# Patient Record
Sex: FEMALE | Race: WHITE | Employment: FULL TIME | URBAN - METROPOLITAN AREA
[De-identification: names, ages, dates, MRNs, and addresses within clinical notes are randomized per-mention and may not be internally consistent; named-entity substitution may affect disease eponyms.]

---

## 2023-02-20 ENCOUNTER — HOSPITAL ENCOUNTER (OUTPATIENT)
Dept: REHABILITATION | Age: 53
Discharge: HOME | End: 2023-02-23
Payer: COMMERCIAL

## 2023-02-20 ENCOUNTER — HOSPITAL ENCOUNTER (OUTPATIENT)
Dept: SURGERY | Age: 53
Discharge: HOME | End: 2023-02-23
Payer: COMMERCIAL

## 2023-02-20 VITALS
OXYGEN SATURATION: 96 % | DIASTOLIC BLOOD PRESSURE: 89 MMHG | SYSTOLIC BLOOD PRESSURE: 147 MMHG | WEIGHT: 185.7 LBS | HEIGHT: 64 IN | RESPIRATION RATE: 16 BRPM | HEART RATE: 78 BPM | TEMPERATURE: 98.1 F | BODY MASS INDEX: 31.7 KG/M2

## 2023-02-20 DIAGNOSIS — R06.83 SNORING: ICD-10-CM

## 2023-02-20 LAB
BACTERIA SPEC CULT: NORMAL
SERVICE CMNT-IMP: NORMAL

## 2023-02-20 PROCEDURE — 97161 PT EVAL LOW COMPLEX 20 MIN: CPT

## 2023-02-20 PROCEDURE — 94664 DEMO&/EVAL PT USE INHALER: CPT

## 2023-02-20 PROCEDURE — 94760 N-INVAS EAR/PLS OXIMETRY 1: CPT

## 2023-02-20 PROCEDURE — 87641 MR-STAPH DNA AMP PROBE: CPT

## 2023-02-20 RX ORDER — AMLODIPINE BESYLATE 10 MG/1
10 TABLET ORAL EVERY EVENING
COMMUNITY

## 2023-02-20 RX ORDER — ONDANSETRON HYDROCHLORIDE 8 MG/1
8 TABLET, FILM COATED ORAL EVERY 8 HOURS PRN
COMMUNITY

## 2023-02-20 RX ORDER — FLUTICASONE FUROATE AND VILANTEROL 100; 25 UG/1; UG/1
1 POWDER RESPIRATORY (INHALATION) DAILY PRN
COMMUNITY

## 2023-02-20 RX ORDER — NITROFURANTOIN 25; 75 MG/1; MG/1
100 CAPSULE ORAL 2 TIMES DAILY
COMMUNITY

## 2023-02-20 RX ORDER — TRAMADOL HYDROCHLORIDE 50 MG/1
50 TABLET ORAL EVERY 6 HOURS PRN
COMMUNITY

## 2023-02-20 RX ORDER — HYDROCHLOROTHIAZIDE 12.5 MG/1
12.5 CAPSULE, GELATIN COATED ORAL EVERY EVENING
COMMUNITY

## 2023-02-20 RX ORDER — DIPHENHYDRAMINE HCL 25 MG
25 TABLET ORAL EVERY 6 HOURS PRN
COMMUNITY

## 2023-02-20 RX ORDER — RIZATRIPTAN BENZOATE 10 MG/1
10 TABLET ORAL
COMMUNITY

## 2023-02-20 RX ORDER — TRAMADOL HYDROCHLORIDE 200 MG/1
200 TABLET, EXTENDED RELEASE ORAL EVERY EVENING
COMMUNITY

## 2023-02-20 ASSESSMENT — PAIN SCALES - GENERAL
PAINLEVEL_OUTOF10: 6
PAINLEVEL_OUTOF10: 3

## 2023-02-20 ASSESSMENT — HOOS JR
BENDING TO THE FLOOR TO PICK UP OBJECT: 3
SITTING: 2
HOOS JR RAW SCORE: 15
HOOS JR RAW SCORE: 15
LYING IN BED (TURNING OVER, MAINTAINING HIP POSITION): 2
RISING FROM SITTING: 2
GOING UP OR DOWN STAIRS: 3
WALKING ON UNEVEN SURFACE: 3
HOOS JR TOTAL INTERVAL SCORE: 43.335

## 2023-02-20 ASSESSMENT — PULMONARY FUNCTION TESTS
FEV1 (%PREDICTED): 83
FEV1 (LITERS): 2.18

## 2023-02-20 ASSESSMENT — PAIN DESCRIPTION - ORIENTATION
ORIENTATION: RIGHT
ORIENTATION: RIGHT

## 2023-02-20 ASSESSMENT — PAIN DESCRIPTION - DESCRIPTORS: DESCRIPTORS: ACHING

## 2023-02-20 ASSESSMENT — PAIN - FUNCTIONAL ASSESSMENT: PAIN_FUNCTIONAL_ASSESSMENT: PREVENTS OR INTERFERES SOME ACTIVE ACTIVITIES AND ADLS

## 2023-02-20 ASSESSMENT — PAIN DESCRIPTION - LOCATION
LOCATION: HIP
LOCATION: HIP;LEG

## 2023-02-20 NOTE — PERIOP NOTE
Please fax recent lab results, EKG tracing and stress test results done for pre-op to [phone]      Thank you,     [facility] pre-assessment department    Phone Number: [phone]    Fax Number: [phone]        Please do not respond via e-mail to this message.

## 2023-02-20 NOTE — PROGRESS NOTES
02/20/23 1340   Treatment   Treatment Type Bedside spirometry   Oxygen Therapy/Pulse Ox   O2 Therapy Room air   Heart Rate 78   SpO2 96 %   Pulse Oximeter Device Mode Intermittent   $Pulse Oximeter $Spot check (single)   Bedside Spirometry   FEV-1/Actual (Liters) 2.18 Liters   FEV-1/Predicted (Liters) 83 Liters   RT Misc Charges   $RT Education $HHN/MDI/IPPB Demo or Eval  (spacer)   Pt's symptoms include:    Snoring  Tiredness- excessive daytime sleepiness  Waking up from sleep snoring  HTN  GERD  Neck size       33.5       cm  Modified Barnes stage 3  SACS Score 4  STOP BANG 4  Height     5 ' 3.5   \"   Weight   185  lbs  BMI 32.35    Sleepiness Scale:     Sitting and reading 3    Watching TV 3    Sitting inactive in a public place 0    As a passenger in a car for an hour without a break 2    Lying down to rest in the afternoon when circumstances Permits 3    Sitting and talking to someone 0    Sitting quietly after lunch without alcohol 2    In a car, while stopped for a few minutes 0    Total :   13    Refer patient for sleep study based on above assessment. [facility]  Phone number:  [phone]      Initial respiratory Assessment completed with pt. Pt was interviewed and evaluated in Joint camp prior to surgery. Patient ID:  Maxine Thomas  [id number]  45 y.o.  1970  Surgeon:  Donovan Marie  Date of Surgery: [email or web address]  Procedure: Total Right Hip Arthroplasty  Primary Care Physician: Ulisses Oconnell [phone]  Specialists:     BS:CLEAR, DIMINISHED      Pt taught proper COUGH technique  IS REVIEWED WITH PT AS WELL AS BENEFITS OF USING IS IN SEDENTARY PTS.   DIAPHRAGMATIC BREATHING EXERCISE INSTRUCTIONS GIVEN    History of smoking:   DENIES                 Quit date:         Secondhand smoke:DENIES    Past procedures with Oxygen desaturation or delayed awakening:DENIES    Past Medical History:   Diagnosis Date    Arthritis     Depression     No meds    GERD (gastroesophageal reflux disease)     OTC meds as needed    History of snoring     Hypertension     Managed with meds    PONV (postoperative nausea and vomiting)     Seasonal allergies     Inhaler as needed- last use 2/19/23    Streptococcal arthritis of right hip ([address] 12/13/2021    HX OF COVID  HX OF PNA  HX OF WHEEZING    Respiratory history:DENIES SOB                                                                  Respiratory meds:  BREO Q DAY  PT uses MDI PRN with PROAIR. MDI instructions given verbally & written along with spacer. Pt to use home MDI's morning of surgery & bring to [address]:                PAST SLEEP STUDY:                     DENIES  HX OF KYLE:                                         DENIES  KYLE assessment:     DANGERS OF UNTREATED KYLE EXPLAINED TO PT.                                          SLEEPS ON    BACK           PHYSICAL EXAM   Body mass index is 32.38 kg/m². Vitals:    02/20/23 1340   BP:    Pulse: (P) 78   Resp:    Temp:    SpO2: (P) 96%     Neck circumference:  33.5    cm    Loud snoring:                                                 YES             Witnessed apnea or wakening gasping or choking:        WAKES UP SNORNG  Awakens with headaches:                                               DENIES  Morning or daytime tiredness/ sleepiness:                             TIRED  Dry mouth or sore throat in morning:            YES                                              Barnes stage:  3                                   SACS score:4  Stop Bang                                    CS HS  RESPIRATORY ASSESSMENT Q SHIFT   O2 PRN    ALBUTEROL  NEBULIZER Q6 PRN WHEEZING                                           Referrals:  [facility]  Pt.  Phone Number:

## 2023-02-20 NOTE — PROGRESS NOTES
Total Joint Surgery Preoperative Chart Review      Patient ID:  Edmar Garay  [id number]  45 y.o.  1970  Surgeon: Dr Morales Zamorano  Date of Surgery: 3/2/2023  Procedure: Total Right Hip Arthroplasty  Primary Care Physician: Maria Isabel Uriostegui [phone]  Specialty Physician(s):      Subjective:   Edmar Garay is a 45 y.o. White (non-) female who presents for preoperative evaluation for Total Right Hip arthroplasty. This is a preoperative chart review note based on data collected by the nurse at the surgical Pre-Assessment visit. Past Medical History:   Diagnosis Date    Arthritis     Depression     No meds    GERD (gastroesophageal reflux disease)     OTC meds as needed    History of snoring     Hypertension     Managed with meds    PONV (postoperative nausea and vomiting)     Seasonal allergies     Inhaler as needed- last use 2/19/23    Streptococcal arthritis of right hip ([address] 12/13/2021      Past Surgical History:   Procedure Laterality Date    GALLBLADDER SURGERY      INCISION AND DRAINAGE HIP Right     2021,2022    SHOULDER DEBRIDEMENT Right 2022    TUBAL LIGATION  2004     History reviewed. No pertinent family history. Social History     Tobacco Use    Smoking status: Never    Smokeless tobacco: Never   Substance Use Topics    Alcohol use: Not Currently       Prior to Admission medications    Medication Sig Start Date End Date Taking? Authorizing Provider   amLODIPine (NORVASC) 10 MG tablet Take 10 mg by mouth every evening   Yes Historical Provider, MD   hydroCHLOROthiazide (MICROZIDE) 12.5 MG capsule Take 12.5 mg by mouth every evening   Yes Historical Provider, MD   nitrofurantoin, macrocrystal-monohydrate, (MACROBID) 100 MG capsule Take 100 mg by mouth 2 times daily 3 more days left   Yes Historical Provider, MD   traMADol Sukumar ALICIA) 200 MG extended release tablet Take 200 mg by mouth every evening.    Yes Historical Provider, MD   traMADol (ULTRAM) 50 MG tablet Take 50 mg by mouth every 6 hours as needed for Pain. Yes Historical Provider, MD   Diclofenac Sodium 100 MG TB24 Take 100 mg by mouth every evening   Yes Historical Provider, MD   ondansetron (ZOFRAN) 8 MG tablet Take 8 mg by mouth every 8 hours as needed for Nausea or Vomiting   Yes Historical Provider, MD   rizatriptan (MAXALT) 10 MG tablet Take 10 mg by mouth once as needed for Migraine May repeat in 2 hours if needed   Yes Historical Provider, MD   fluticasone furoate-vilanterol (BREO ELLIPTA) 100-25 MCG/ACT inhaler Inhale 1 puff into the lungs daily as needed (Seasonal allergies)   Yes Historical Provider, MD   diphenhydrAMINE (BENADRYL) 25 MG tablet Take 25 mg by mouth every 6 hours as needed for Sleep or Allergies   Yes Historical Provider, MD   Coenzyme Q10 (COQ-10) 100 MG CAPS Take by mouth daily   Yes Historical Provider, MD   Multiple Vitamins-Minerals (MULTI COMPLETE PO) Take by mouth daily   Yes Historical Provider, MD   Potassium 95 MG TABS Take by mouth daily   Yes Historical Provider, MD   vitamin D (CHOLECALCIFEROL) 25 MCG (1000 UT) TABS tablet Take 1,000 Units by mouth daily   Yes Historical Provider, MD   metFORMIN (GLUCOPHAGE) 1000 MG tablet Take 1,500 mg by mouth every evening   Yes Historical Provider, MD     No Known Allergies       Objective:     Physical Exam:   Patient Vitals for the past 24 hrs:   Temp Pulse Resp BP SpO2   02/20/23 1340 -- 78 -- -- 96 %   02/20/23 1228 98.1 °F (36.7 °C) 76 16 (!) 147/89 92 %       ECG:    No results found for this or any previous visit (from the past 4464 hour(s)). Data Review:   Labs:   Requested labs from PCP       Problem List:  )  Patient Active Problem List   Diagnosis    Snoring       Total Joint Surgery Pre-Assessment Recommendations:           Patient reports the symptoms of snoring, fatigue, observed apnea and /or excessive daytime sleepiness. Will refer patient for HST based on above assessment.    Recommend continuous saturation monitoring hours of sleep, during hospitalization. Albuterol every 6 hours as need during hospitalization.        Signed By: Cari Nguyễn, WANDA - CNP-C    February 20, 2023

## 2023-02-20 NOTE — PROGRESS NOTES
Karel Leary  : 1970  Primary: Jojo Ruiz  Secondary:  Joint Camp at [facility]  [address], [address]  [phone]        Physical Therapy Prehab Evaluation Summary:2023   Time In/Out   PT Charge Capture  Episode     MEDICAL/REFERRING DIAGNOSIS: Unilateral primary osteoarthritis, right hip [M16.11]  REFERRING PHYSICIAN: Yesica Cordero MD    ICD-10: Treatment Diagnosis:   Pain in Right Hip (M25.551)  Stiffness of Right Hip, Not elsewhere classified (M25.651)    DATE OF SURGERY: 3/2/23  Assessment:   COMMENTS:  [place]. States she will be having anterior approach. Arrives to department via W/C but ambulated around department without an AD although gait is quite antalgic. PROBLEM LIST:   (Impacting functional limitations):  Ms. Corwin Mullins presents with the following lower extremity(s) problems:  Gait  Strength  Range of Motion  Home Exercise Program  Pain INTERVENTIONS PLANNED:   (Benefits and precautions of physical therapy have been discussed with the patient.)  Home Exercise Program  Educational Discussion       GOALS: (Goals have been discussed and agreed upon with patient.)  Discharge Goals: Time Frame: 1 Day  Patient will demonstrate independence with a home exercise program designed to increase functional technique and pain control to minimize functional deficits and optimize patient for total joint replacement. Subjective:   Past Medical History/Comorbidities:   Ms. Corwin Mullins  has a past medical history of Arthritis, Depression, GERD (gastroesophageal reflux disease), History of snoring, Hypertension, PONV (postoperative nausea and vomiting), Seasonal allergies, and Streptococcal arthritis of right hip ([facility] [address] Ms. Corwin Mullins  has a past surgical history that includes Incision and drainage hip (Right); Shoulder Debridement (Right, ); Gallbladder surgery; and Tubal ligation ().     Allergies:  Patient has no known allergies.     Current Medications:  Refer to pre-assessment nursing note    Home Set-Up/Prior Level of Function:  Lives With: Spouse  Type of Home: House  Home Layout: One level  Home Access: Stairs to enter without rails  Entrance Stairs - Number of Steps: 3  Bathroom Shower/Tub: Walk-in shower  Bathroom Equipment: Shower chair, Toilet raiser  Home Equipment: Alda Laila, standard, Rollator, Walker, rolling, Cane, Crutches    Dominant Side:  Dominant Hand: : Right    Current Functional Status:   Ambulation:  [x] Independent  [x] Walk Indoors Only  [] Walk Outdoors  [] Use Assistive Device  [x] Use Wheelchair for longer distances Dressing:  [x] [address] from Someone for:  [x] Sock/Shoes-sometimes  [] Pants  [] Everything   Bathing/Showering:   [x] Independent  [] Requires Assistance from Someone  [] [address]:  [] Routine house and yard work  [] Light Housework Only  [x] None     Objective:   PAIN:   Pre-Treatment Pain  Pain Assessment: 0-10  Pain Level: 3 (3 in sitting; 6 with activity)  Pain Location: Hip, Leg  Pain Orientation: Right    Post Treatment: 3    Outcome Measure:   HOOS-JR:  Total Raw Score (0-24 Scale): 15    KOOS-JR:       LOWER EXTREMITY GROSS EVALUATION:  RIGHT LE   Within Functional Limits   Abnormal   Comments   Strength [] [] Strength RLE  R Hip Flexion: 2+/5  R Knee Flexion: 4-/5  R Knee Extension: 4-/5   Range of Motion [] [] AROM RLE (degrees)  R Hip Flexion (0-125): 95  R Hip ABduction (0-45): 10      LEFT LE Within Functional Limits   Abnormal   Comments   Strength [x] []     Range of Motion [x] []       UPPER EXTREMITY GROSS EVALUATION:     Within Functional Limits   Abnormal   Comments   Strength [] []    Range of Motion [x] []      SENSATION  Sensation [x]       COGNITION/  PERCEPTION: Intact Impaired (Comments):   Orientation [x]     Vision [x]     Hearing [x]     Cognition  [x]       TRANSFERS: I Mod I S SBA CGA Min Mod Max Total  NT x2 Comments:   Sit to Stand [] [x] [] [] [] [] [] [] [] [] []    Stand to Sit [] [x] [] [] [] [] [] [] [] [] []    Stand pivot [x] [] [] [] [] [] [] [] [] [] []     [] [] [] [] [] [] [] [] [] [] []    I=Independent, Mod I=Modified Independent, S=Supervision, SBA=Standby Assistance, CGA=Contact Guard Assistance,   Min=Minimal Assistance, Mod=Moderate Assistance, Max=Maximal Assistance, Total=Total Assistance, NT=Not Tested    BALANCE: Good Fair+ Fair Fair- Poor NT Comments   Sitting Static [x] [] [] [] [] []    Sitting Dynamic [x] [] [] [] [] []              Standing Static [] [x] [] [] [] []    Standing Dynamic [] [x] [x] [] [] []      Postural Assessment: Forward Head  Rounded Shoulders    GAIT: I Mod I S SBA CGA Min Mod Max Total  NT x2 Comments:   Level of Assistance [x] [] [] [] [] [] [] [] [] [] []    Weightbearing Status       Distance  50 feet    Gait Quality Antalgic, Decreased guerline , Decreased step length, Decreased stance, and Trunk sway increased    DME None     Stairs      Ramp     I=Independent, Mod I=Modified Independent, S=Supervision, SBA=Standby Assistance, CGA=Contact Guard Assistance,   Min=Minimal Assistance, Mod=Moderate Assistance, Max=Maximal Assistance, Total=Total Assistance, NT=Not Tested    TREATMENT:   EVALUATION: LOW COMPLEXITY: (Untimed Charge)    TREATMENT PLAN:   Effective Dates: 2/20/2023 TO 2/20/2023. Treatment/Session Assessment:  Patient was instructed in PT- HEP to increase strength and ROM in LEs. Answered all questions. Frequency/Duration: Patient to continue to perform home exercise program at least twice per day up until her surgery.     EDUCATION: Education Given To: Patient, Family  Education Provided: Role of Therapy, Plan of Care, Home Exercise Program  Education Method: Demonstration, Verbal, Printed Information/Hand-outs  Education Outcome: Verbalized understanding, Demonstrated understanding    MEDICAL NECESSITY: Ms. Corwin Mullins is expected to optimize [facility] extremity strength and ROM in preparation for joint replacement surgery. REASON FOR CONTINUED SERVICES: Achieve baseline assesment of musculoskeletal system, functional mobility and home environment. , educate in PT HEP in preparation for surgery, educate in hospital plan of care. COMPLIANCE WITH PROGRAM/EXERCISE: Will assess as treatment progresses. TOTAL TREATMENT DURATION:  Time In: 1200  Time Out: 1230  Minutes: 27    Regarding Silvestre Clay's therapy, I certify that the treatment plan above will be carried out by a therapist or under their direction.   Thank you for this referral,  Lauren Healy, PT

## 2023-02-20 NOTE — PERIOP NOTE
PLEASE CONTINUE TAKING ALL PRESCRIPTION MEDICATIONS UP TO THE DAY OF SURGERY UNLESS OTHERWISE DIRECTED BELOW. DISCONTINUE all vitamins, herbals and supplements 21 days prior to surgery. DISCONTINUE Non-Steriodal Anti-Inflammatory (NSAIDS) such as Advil, Ibuprofen, Motrin, Naproxen and Aleve 5 days prior to surgery. Home Medications to take  the day of surgery   Tramadol if needed, Breo inhaler            Home Medications   to Hold   Stop diclofenac five days prior to surgery    Stop vitamins and supplements now      Comments      On the day before surgery please take Acetaminophen 1000mg in the morning and then again before bed. You may substitute for Tylenol 650 mg.    Bring Inhaler,Dynahex wash and Incentive Spirometer with you to hospital on the day of surgery. Please do not bring home medications with you on the day of surgery unless otherwise directed by your nurse. If you are instructed to bring home medications, please give them to your nurse as they will be administered by the nursing staff. If you have any questions, please call Dolores Alegria [phone]. A copy of this note was provided to the patient for reference.

## 2023-02-20 NOTE — PERIOP NOTE
Patient verified name and . Order for consent found in EHR and matches case posting; patient verified. Type 3 surgery, PAT Joint assessment complete. Labs per surgeon: none. Labs per anesthesia protocol: per pt had pre-op labs done at Dr. Albino Gonzalez on 2/15/23; fax sent requesting lab results/EKG/stress test.   EKG:requested from Dr. Joey Shannon office. MRSA/MSSA swab collected; pharmacy to review and dose antibiotic as appropriate. Hospital approved surgical skin cleanser and instructions to return bottle on DOS given per hospital policy. Patient provided with handouts including Guide to Surgery, Pain Management, Hand Hygiene, Blood Transfusion Education, and [facility] Brochure. Patient answered medical/surgical history questions at their best of ability. All prior to admission medications documented in [facility]. Original medication prescription bottle not visualized during patient appointment. Patient instructed to hold all vitamins 3 weeks prior to surgery and NSAIDS 5 days prior to surgery. Patient teach back successful and patient demonstrates knowledge of instruction.

## 2023-02-21 NOTE — PROGRESS NOTES
Message left at medical records at Dr. Alin Boykin office requesting last labs, EKG and stress test.

## 2023-02-27 NOTE — PROGRESS NOTES
Called Dr. Judith Granda office and left message requesting latest labs, EKG tracing and stress test be faxed to PAT.

## 2023-02-27 NOTE — PROGRESS NOTES
Message left for medical records at Dr. Isha Blackwell office requesting latest labs, EKG tracing and stress test be faxed to PAT.

## 2023-02-28 NOTE — PROGRESS NOTES
Records received from Dr. Claudia Sibley office, including labs dated 02/24/23, 02/14/23 & 02/10/23, EKG 02/10/23 and Stress Test 02/15/23, scanned into EHR, anesthesia to review.

## 2023-03-01 ENCOUNTER — ANESTHESIA EVENT (OUTPATIENT)
Dept: SURGERY | Age: 53
End: 2023-03-01
Payer: COMMERCIAL

## 2023-03-02 ENCOUNTER — APPOINTMENT (OUTPATIENT)
Dept: GENERAL RADIOLOGY | Age: 53
End: 2023-03-02
Attending: ORTHOPAEDIC SURGERY
Payer: COMMERCIAL

## 2023-03-02 ENCOUNTER — ANESTHESIA (OUTPATIENT)
Dept: SURGERY | Age: 53
End: 2023-03-02
Payer: COMMERCIAL

## 2023-03-02 ENCOUNTER — HOSPITAL ENCOUNTER (OUTPATIENT)
Age: 53
Discharge: HOME | End: 2023-03-03
Attending: ORTHOPAEDIC SURGERY | Admitting: ORTHOPAEDIC SURGERY
Payer: COMMERCIAL

## 2023-03-02 DIAGNOSIS — Z01.818 PRE-OP TESTING: Primary | ICD-10-CM

## 2023-03-02 DIAGNOSIS — M16.11 OSTEOARTHRITIS OF RIGHT HIP, UNSPECIFIED OSTEOARTHRITIS TYPE: ICD-10-CM

## 2023-03-02 PROBLEM — R73.03 PREDIABETES: Status: ACTIVE | Noted: 2023-03-02

## 2023-03-02 PROBLEM — E66.9 OBESITY (BMI 30.0-34.9): Status: ACTIVE | Noted: 2023-03-02

## 2023-03-02 PROBLEM — I10 ESSENTIAL HYPERTENSION: Status: ACTIVE | Noted: 2023-03-02

## 2023-03-02 LAB
ABO + RH BLD: NORMAL
BLOOD GROUP ANTIBODIES SERPL: NORMAL
GLUCOSE BLD STRIP.AUTO-MCNC: 88 MG/DL (ref 65–100)
HCT VFR BLD AUTO: 33.1 % (ref 35.8–46.3)
HGB BLD-MCNC: 10.5 G/DL (ref 11.7–15.4)
POTASSIUM BLD-SCNC: 3.7 MMOL/L (ref 3.5–5.1)
SERVICE CMNT-IMP: NORMAL
SPECIMEN EXP DATE BLD: NORMAL

## 2023-03-02 PROCEDURE — 6360000002 HC RX W HCPCS: Performed by: ORTHOPAEDIC SURGERY

## 2023-03-02 PROCEDURE — 6360000002 HC RX W HCPCS: Performed by: NURSE ANESTHETIST, CERTIFIED REGISTERED

## 2023-03-02 PROCEDURE — 6370000000 HC RX 637 (ALT 250 FOR IP): Performed by: ORTHOPAEDIC SURGERY

## 2023-03-02 PROCEDURE — 2580000003 HC RX 258: Performed by: ANESTHESIOLOGY

## 2023-03-02 PROCEDURE — 88307 TISSUE EXAM BY PATHOLOGIST: CPT

## 2023-03-02 PROCEDURE — 2580000003 HC RX 258: Performed by: ORTHOPAEDIC SURGERY

## 2023-03-02 PROCEDURE — 88311 DECALCIFY TISSUE: CPT

## 2023-03-02 PROCEDURE — 6370000000 HC RX 637 (ALT 250 FOR IP): Performed by: ANESTHESIOLOGY

## 2023-03-02 PROCEDURE — 3600000015 HC SURGERY LEVEL 5 ADDTL 15MIN: Performed by: ORTHOPAEDIC SURGERY

## 2023-03-02 PROCEDURE — 87075 CULTR BACTERIA EXCEPT BLOOD: CPT

## 2023-03-02 PROCEDURE — 7100000000 HC PACU RECOVERY - FIRST 15 MIN: Performed by: ORTHOPAEDIC SURGERY

## 2023-03-02 PROCEDURE — 2500000003 HC RX 250 WO HCPCS: Performed by: NURSE ANESTHETIST, CERTIFIED REGISTERED

## 2023-03-02 PROCEDURE — 6370000000 HC RX 637 (ALT 250 FOR IP)

## 2023-03-02 PROCEDURE — 3700000000 HC ANESTHESIA ATTENDED CARE: Performed by: ORTHOPAEDIC SURGERY

## 2023-03-02 PROCEDURE — 3700000001 HC ADD 15 MINUTES (ANESTHESIA): Performed by: ORTHOPAEDIC SURGERY

## 2023-03-02 PROCEDURE — 87176 TISSUE HOMOGENIZATION CULTR: CPT

## 2023-03-02 PROCEDURE — 82962 GLUCOSE BLOOD TEST: CPT

## 2023-03-02 PROCEDURE — 86850 RBC ANTIBODY SCREEN: CPT

## 2023-03-02 PROCEDURE — 7100000001 HC PACU RECOVERY - ADDTL 15 MIN: Performed by: ORTHOPAEDIC SURGERY

## 2023-03-02 PROCEDURE — 2500000003 HC RX 250 WO HCPCS: Performed by: ORTHOPAEDIC SURGERY

## 2023-03-02 PROCEDURE — 2700000000 HC OXYGEN THERAPY PER DAY

## 2023-03-02 PROCEDURE — 6360000002 HC RX W HCPCS: Performed by: ANESTHESIOLOGY

## 2023-03-02 PROCEDURE — 72170 X-RAY EXAM OF PELVIS: CPT

## 2023-03-02 PROCEDURE — A4217 STERILE WATER/SALINE, 500 ML: HCPCS | Performed by: ORTHOPAEDIC SURGERY

## 2023-03-02 PROCEDURE — C1713 ANCHOR/SCREW BN/BN,TIS/BN: HCPCS | Performed by: ORTHOPAEDIC SURGERY

## 2023-03-02 PROCEDURE — 84132 ASSAY OF SERUM POTASSIUM: CPT

## 2023-03-02 PROCEDURE — 88304 TISSUE EXAM BY PATHOLOGIST: CPT

## 2023-03-02 PROCEDURE — 87205 SMEAR GRAM STAIN: CPT

## 2023-03-02 PROCEDURE — 2709999900 HC NON-CHARGEABLE SUPPLY: Performed by: ORTHOPAEDIC SURGERY

## 2023-03-02 PROCEDURE — 2720000010 HC SURG SUPPLY STERILE: Performed by: ORTHOPAEDIC SURGERY

## 2023-03-02 PROCEDURE — C1776 JOINT DEVICE (IMPLANTABLE): HCPCS | Performed by: ORTHOPAEDIC SURGERY

## 2023-03-02 PROCEDURE — 85014 HEMATOCRIT: CPT

## 2023-03-02 PROCEDURE — 3600000005 HC SURGERY LEVEL 5 BASE: Performed by: ORTHOPAEDIC SURGERY

## 2023-03-02 PROCEDURE — 94760 N-INVAS EAR/PLS OXIMETRY 1: CPT

## 2023-03-02 PROCEDURE — 2500000003 HC RX 250 WO HCPCS: Performed by: ANESTHESIOLOGY

## 2023-03-02 DEVICE — SCREW BNE L25MM DIA6.5MM CANC HIP S STL GRIPTION FULL THRD: Type: IMPLANTABLE DEVICE | Site: HIP | Status: FUNCTIONAL

## 2023-03-02 DEVICE — LINER ACET OD54MM ID36MM HIP ALTRX PINN: Type: IMPLANTABLE DEVICE | Site: HIP | Status: FUNCTIONAL

## 2023-03-02 DEVICE — STEM FEM SZ 2 HI OFFSET CLLRD 12/14 TAPR ACTIS ARTC EZ: Type: IMPLANTABLE DEVICE | Site: HIP | Status: FUNCTIONAL

## 2023-03-02 DEVICE — HIP H2 TOT ADV OTHER HD IMPL CAPPED SYNTHES: Type: IMPLANTABLE DEVICE | Site: HIP | Status: FUNCTIONAL

## 2023-03-02 DEVICE — SCREW BNE L20MM DIA6.5MM CANC HIP S STL GRIPTION FULL THRD: Type: IMPLANTABLE DEVICE | Site: HIP | Status: FUNCTIONAL

## 2023-03-02 DEVICE — HEAD FEM DIA36MM +1.5MM OFFSET 12/14 TAPR HIP CERAMIC: Type: IMPLANTABLE DEVICE | Site: HIP | Status: FUNCTIONAL

## 2023-03-02 DEVICE — STIMULAN® RAPID CURE PROVIDED STERILE FOR SINGLE PATIENT USE. STIMULAN® RAPID CURE CONTAINS CALCIUM SULFATE POWDER AND MIXING SOLUTION IN PRE-MEASURED QUANTITIES SO THAT WHEN MIXED TOGETHER IN A STERILE MIXING BOWL, THE RESULTANT PASTE IS TO BE DIGITALLY PACKED INTO OPEN BONE VOID/GAP TO SET INSITU OR PLACED INTO THE MOULD PROVIDED, THE MIXTURE SETS TO FORM BEADS. THE BIODEGRADABLE, RADIOPAQUE BEADS ARE RESORBED IN APPROXIMATELY 30 – 60 DAYS WHEN USED IN ACCORDANCE WITH THE DEVICE LABELLING. STIMULAN® RAPID CURE IS MANUFACTURED FROM SYNTHETIC IMPLANT GRADE CALCIUM SULFATE DIHYDRATE(CASO4.2H2O) THAT RESORBS AND IS REPLACED WITH BONE DURING THE HEALING PROCESS. ALSO, AS THE BONE VOID FILLER BEADS ARE BIODEGRADABLE AND BIOCOMPATIBLE, THEY MAY BE USED AT AN INFECTED SITE.
Type: IMPLANTABLE DEVICE | Site: HIP | Status: FUNCTIONAL
Brand: STIMULAN® RAPID CURE

## 2023-03-02 RX ORDER — SODIUM CHLORIDE 9 MG/ML
INJECTION, SOLUTION INTRAVENOUS PRN
Status: DISCONTINUED | OUTPATIENT
Start: 2023-03-02 | End: 2023-03-02 | Stop reason: HOSPADM

## 2023-03-02 RX ORDER — ROCURONIUM BROMIDE 10 MG/ML
INJECTION, SOLUTION INTRAVENOUS PRN
Status: DISCONTINUED | OUTPATIENT
Start: 2023-03-02 | End: 2023-03-02 | Stop reason: SDUPTHER

## 2023-03-02 RX ORDER — ONDANSETRON 2 MG/ML
4 INJECTION INTRAMUSCULAR; INTRAVENOUS
Status: DISCONTINUED | OUTPATIENT
Start: 2023-03-02 | End: 2023-03-02 | Stop reason: HOSPADM

## 2023-03-02 RX ORDER — MELOXICAM 15 MG/1
15 TABLET ORAL DAILY
Qty: 14 TABLET | Refills: 0 | Status: SHIPPED | OUTPATIENT
Start: 2023-03-03 | End: 2023-03-17

## 2023-03-02 RX ORDER — KETOROLAC TROMETHAMINE 30 MG/ML
INJECTION, SOLUTION INTRAMUSCULAR; INTRAVENOUS PRN
Status: DISCONTINUED | OUTPATIENT
Start: 2023-03-02 | End: 2023-03-02 | Stop reason: HOSPADM

## 2023-03-02 RX ORDER — LIDOCAINE HYDROCHLORIDE 10 MG/ML
1 INJECTION, SOLUTION INFILTRATION; PERINEURAL
Status: DISCONTINUED | OUTPATIENT
Start: 2023-03-02 | End: 2023-03-02 | Stop reason: HOSPADM

## 2023-03-02 RX ORDER — CELECOXIB 200 MG/1
200 CAPSULE ORAL ONCE
Status: COMPLETED | OUTPATIENT
Start: 2023-03-02 | End: 2023-03-02

## 2023-03-02 RX ORDER — HYDROCHLOROTHIAZIDE 12.5 MG/1
12.5 CAPSULE, GELATIN COATED ORAL EVERY EVENING
Status: DISCONTINUED | OUTPATIENT
Start: 2023-03-02 | End: 2023-03-03 | Stop reason: HOSPADM

## 2023-03-02 RX ORDER — APREPITANT 40 MG/1
40 CAPSULE ORAL ONCE
Status: COMPLETED | OUTPATIENT
Start: 2023-03-02 | End: 2023-03-02

## 2023-03-02 RX ORDER — DEXAMETHASONE SODIUM PHOSPHATE 4 MG/ML
INJECTION, SOLUTION INTRA-ARTICULAR; INTRALESIONAL; INTRAMUSCULAR; INTRAVENOUS; SOFT TISSUE PRN
Status: DISCONTINUED | OUTPATIENT
Start: 2023-03-02 | End: 2023-03-02 | Stop reason: SDUPTHER

## 2023-03-02 RX ORDER — SODIUM CHLORIDE 0.9 % (FLUSH) 0.9 %
5-40 SYRINGE (ML) INJECTION EVERY 12 HOURS SCHEDULED
Status: DISCONTINUED | OUTPATIENT
Start: 2023-03-02 | End: 2023-03-02 | Stop reason: HOSPADM

## 2023-03-02 RX ORDER — ACETAMINOPHEN 500 MG
1000 TABLET ORAL EVERY 8 HOURS
Status: DISCONTINUED | OUTPATIENT
Start: 2023-03-02 | End: 2023-03-03 | Stop reason: HOSPADM

## 2023-03-02 RX ORDER — SODIUM CHLORIDE 0.9 % (FLUSH) 0.9 %
5-40 SYRINGE (ML) INJECTION PRN
Status: DISCONTINUED | OUTPATIENT
Start: 2023-03-02 | End: 2023-03-02 | Stop reason: HOSPADM

## 2023-03-02 RX ORDER — ACETAMINOPHEN 500 MG
1000 TABLET ORAL ONCE
Status: DISCONTINUED | OUTPATIENT
Start: 2023-03-02 | End: 2023-03-02 | Stop reason: HOSPADM

## 2023-03-02 RX ORDER — GLYCOPYRROLATE 0.2 MG/ML
INJECTION INTRAMUSCULAR; INTRAVENOUS PRN
Status: DISCONTINUED | OUTPATIENT
Start: 2023-03-02 | End: 2023-03-02 | Stop reason: SDUPTHER

## 2023-03-02 RX ORDER — DEXAMETHASONE SODIUM PHOSPHATE 10 MG/ML
8 INJECTION INTRAMUSCULAR; INTRAVENOUS ONCE
Status: DISCONTINUED | OUTPATIENT
Start: 2023-03-02 | End: 2023-03-02 | Stop reason: HOSPADM

## 2023-03-02 RX ORDER — PROCHLORPERAZINE EDISYLATE 5 MG/ML
5 INJECTION INTRAMUSCULAR; INTRAVENOUS
Status: COMPLETED | OUTPATIENT
Start: 2023-03-02 | End: 2023-03-02

## 2023-03-02 RX ORDER — SENNA AND DOCUSATE SODIUM 50; 8.6 MG/1; MG/1
1 TABLET, FILM COATED ORAL 2 TIMES DAILY
Qty: 28 TABLET | Refills: 0 | Status: SHIPPED | OUTPATIENT
Start: 2023-03-02 | End: 2023-03-16

## 2023-03-02 RX ORDER — MAGNESIUM HYDROXIDE 1200 MG/15ML
LIQUID ORAL CONTINUOUS PRN
Status: DISCONTINUED | OUTPATIENT
Start: 2023-03-02 | End: 2023-03-02 | Stop reason: HOSPADM

## 2023-03-02 RX ORDER — METFORMIN HYDROCHLORIDE 500 MG/1
1000 TABLET, EXTENDED RELEASE ORAL EVERY EVENING
Status: DISCONTINUED | OUTPATIENT
Start: 2023-03-02 | End: 2023-03-03 | Stop reason: HOSPADM

## 2023-03-02 RX ORDER — ACETAMINOPHEN 500 MG
1000 TABLET ORAL EVERY 8 HOURS
Qty: 84 TABLET | Refills: 0 | Status: SHIPPED | OUTPATIENT
Start: 2023-03-03 | End: 2023-03-17

## 2023-03-02 RX ORDER — SODIUM CHLORIDE 0.9 % (FLUSH) 0.9 %
5-40 SYRINGE (ML) INJECTION PRN
Status: DISCONTINUED | OUTPATIENT
Start: 2023-03-02 | End: 2023-03-03 | Stop reason: HOSPADM

## 2023-03-02 RX ORDER — BUPIVACAINE HYDROCHLORIDE AND EPINEPHRINE 5; 5 MG/ML; UG/ML
INJECTION, SOLUTION EPIDURAL; INTRACAUDAL; PERINEURAL PRN
Status: DISCONTINUED | OUTPATIENT
Start: 2023-03-02 | End: 2023-03-02 | Stop reason: HOSPADM

## 2023-03-02 RX ORDER — LIDOCAINE HYDROCHLORIDE 20 MG/ML
INJECTION, SOLUTION EPIDURAL; INFILTRATION; INTRACAUDAL; PERINEURAL PRN
Status: DISCONTINUED | OUTPATIENT
Start: 2023-03-02 | End: 2023-03-02 | Stop reason: SDUPTHER

## 2023-03-02 RX ORDER — TRANEXAMIC ACID 100 MG/ML
INJECTION, SOLUTION INTRAVENOUS PRN
Status: DISCONTINUED | OUTPATIENT
Start: 2023-03-02 | End: 2023-03-02 | Stop reason: SDUPTHER

## 2023-03-02 RX ORDER — ALBUTEROL SULFATE 2.5 MG/3ML
2.5 SOLUTION RESPIRATORY (INHALATION) EVERY 6 HOURS PRN
Status: DISCONTINUED | OUTPATIENT
Start: 2023-03-02 | End: 2023-03-03 | Stop reason: HOSPADM

## 2023-03-02 RX ORDER — EPHEDRINE SULFATE/0.9% NACL/PF 50 MG/5 ML
SYRINGE (ML) INTRAVENOUS PRN
Status: DISCONTINUED | OUTPATIENT
Start: 2023-03-02 | End: 2023-03-02 | Stop reason: SDUPTHER

## 2023-03-02 RX ORDER — PROPOFOL 10 MG/ML
INJECTION, EMULSION INTRAVENOUS PRN
Status: DISCONTINUED | OUTPATIENT
Start: 2023-03-02 | End: 2023-03-02 | Stop reason: SDUPTHER

## 2023-03-02 RX ORDER — ONDANSETRON 2 MG/ML
INJECTION INTRAMUSCULAR; INTRAVENOUS PRN
Status: DISCONTINUED | OUTPATIENT
Start: 2023-03-02 | End: 2023-03-02 | Stop reason: SDUPTHER

## 2023-03-02 RX ORDER — SODIUM CHLORIDE, SODIUM LACTATE, POTASSIUM CHLORIDE, CALCIUM CHLORIDE 600; 310; 30; 20 MG/100ML; MG/100ML; MG/100ML; MG/100ML
INJECTION, SOLUTION INTRAVENOUS CONTINUOUS
Status: DISCONTINUED | OUTPATIENT
Start: 2023-03-02 | End: 2023-03-03 | Stop reason: HOSPADM

## 2023-03-02 RX ORDER — SODIUM CHLORIDE 0.9 % (FLUSH) 0.9 %
5-40 SYRINGE (ML) INJECTION EVERY 12 HOURS SCHEDULED
Status: DISCONTINUED | OUTPATIENT
Start: 2023-03-02 | End: 2023-03-03 | Stop reason: HOSPADM

## 2023-03-02 RX ORDER — KETAMINE HCL IN NACL, ISO-OSM 20 MG/2 ML
10 SYRINGE (ML) INJECTION EVERY 10 MIN PRN
Status: DISCONTINUED | OUTPATIENT
Start: 2023-03-02 | End: 2023-03-03 | Stop reason: HOSPADM

## 2023-03-02 RX ORDER — ONDANSETRON 4 MG/1
4 TABLET, ORALLY DISINTEGRATING ORAL EVERY 8 HOURS PRN
Status: DISCONTINUED | OUTPATIENT
Start: 2023-03-02 | End: 2023-03-03 | Stop reason: HOSPADM

## 2023-03-02 RX ORDER — ASPIRIN 81 MG/1
81 TABLET ORAL 2 TIMES DAILY
Qty: 56 TABLET | Refills: 0 | Status: SHIPPED | OUTPATIENT
Start: 2023-03-02 | End: 2023-03-30

## 2023-03-02 RX ORDER — OXYCODONE HYDROCHLORIDE 5 MG/1
5 TABLET ORAL PRN
Status: COMPLETED | OUTPATIENT
Start: 2023-03-02 | End: 2023-03-02

## 2023-03-02 RX ORDER — SODIUM CHLORIDE 9 MG/ML
INJECTION, SOLUTION INTRAVENOUS PRN
Status: DISCONTINUED | OUTPATIENT
Start: 2023-03-02 | End: 2023-03-02 | Stop reason: SDUPTHER

## 2023-03-02 RX ORDER — SODIUM CHLORIDE 9 MG/ML
INJECTION, SOLUTION INTRAVENOUS CONTINUOUS
Status: DISCONTINUED | OUTPATIENT
Start: 2023-03-02 | End: 2023-03-02 | Stop reason: HOSPADM

## 2023-03-02 RX ORDER — DIPHENHYDRAMINE HYDROCHLORIDE 50 MG/ML
12.5 INJECTION INTRAMUSCULAR; INTRAVENOUS
Status: DISCONTINUED | OUTPATIENT
Start: 2023-03-02 | End: 2023-03-02 | Stop reason: HOSPADM

## 2023-03-02 RX ORDER — ACETAMINOPHEN 500 MG
1000 TABLET ORAL ONCE
Status: DISCONTINUED | OUTPATIENT
Start: 2023-03-02 | End: 2023-03-02 | Stop reason: SDUPTHER

## 2023-03-02 RX ORDER — OXYCODONE HYDROCHLORIDE 5 MG/1
5 TABLET ORAL EVERY 4 HOURS PRN
Status: DISCONTINUED | OUTPATIENT
Start: 2023-03-02 | End: 2023-03-03 | Stop reason: HOSPADM

## 2023-03-02 RX ORDER — SODIUM CHLORIDE, SODIUM LACTATE, POTASSIUM CHLORIDE, CALCIUM CHLORIDE 600; 310; 30; 20 MG/100ML; MG/100ML; MG/100ML; MG/100ML
INJECTION, SOLUTION INTRAVENOUS CONTINUOUS
Status: DISCONTINUED | OUTPATIENT
Start: 2023-03-02 | End: 2023-03-03

## 2023-03-02 RX ORDER — SODIUM CHLORIDE 9 MG/ML
INJECTION, SOLUTION INTRAVENOUS PRN
Status: DISCONTINUED | OUTPATIENT
Start: 2023-03-02 | End: 2023-03-03 | Stop reason: HOSPADM

## 2023-03-02 RX ORDER — ONDANSETRON 2 MG/ML
4 INJECTION INTRAMUSCULAR; INTRAVENOUS EVERY 6 HOURS PRN
Status: DISCONTINUED | OUTPATIENT
Start: 2023-03-02 | End: 2023-03-03 | Stop reason: HOSPADM

## 2023-03-02 RX ORDER — AMLODIPINE BESYLATE 5 MG/1
10 TABLET ORAL EVERY EVENING
Status: DISCONTINUED | OUTPATIENT
Start: 2023-03-02 | End: 2023-03-03 | Stop reason: HOSPADM

## 2023-03-02 RX ORDER — HYDROMORPHONE HYDROCHLORIDE 1 MG/ML
0.5 INJECTION, SOLUTION INTRAMUSCULAR; INTRAVENOUS; SUBCUTANEOUS ONCE
Status: COMPLETED | OUTPATIENT
Start: 2023-03-02 | End: 2023-03-02

## 2023-03-02 RX ORDER — OXYCODONE HYDROCHLORIDE 5 MG/1
10 TABLET ORAL EVERY 4 HOURS PRN
Status: DISCONTINUED | OUTPATIENT
Start: 2023-03-02 | End: 2023-03-03 | Stop reason: HOSPADM

## 2023-03-02 RX ORDER — ASPIRIN 81 MG/1
81 TABLET ORAL 2 TIMES DAILY
Status: DISCONTINUED | OUTPATIENT
Start: 2023-03-02 | End: 2023-03-03 | Stop reason: HOSPADM

## 2023-03-02 RX ORDER — HYDROMORPHONE HYDROCHLORIDE 2 MG/ML
0.5 INJECTION, SOLUTION INTRAMUSCULAR; INTRAVENOUS; SUBCUTANEOUS EVERY 5 MIN PRN
Status: COMPLETED | OUTPATIENT
Start: 2023-03-02 | End: 2023-03-02

## 2023-03-02 RX ORDER — VANCOMYCIN HYDROCHLORIDE 1 G/20ML
INJECTION, POWDER, LYOPHILIZED, FOR SOLUTION INTRAVENOUS PRN
Status: DISCONTINUED | OUTPATIENT
Start: 2023-03-02 | End: 2023-03-02 | Stop reason: HOSPADM

## 2023-03-02 RX ORDER — SODIUM CHLORIDE, SODIUM LACTATE, POTASSIUM CHLORIDE, CALCIUM CHLORIDE 600; 310; 30; 20 MG/100ML; MG/100ML; MG/100ML; MG/100ML
INJECTION, SOLUTION INTRAVENOUS CONTINUOUS
Status: DISCONTINUED | OUTPATIENT
Start: 2023-03-02 | End: 2023-03-02 | Stop reason: HOSPADM

## 2023-03-02 RX ORDER — MELOXICAM 7.5 MG/1
15 TABLET ORAL DAILY
Status: DISCONTINUED | OUTPATIENT
Start: 2023-03-02 | End: 2023-03-03 | Stop reason: HOSPADM

## 2023-03-02 RX ORDER — SENNA AND DOCUSATE SODIUM 50; 8.6 MG/1; MG/1
1 TABLET, FILM COATED ORAL 2 TIMES DAILY
Status: DISCONTINUED | OUTPATIENT
Start: 2023-03-02 | End: 2023-03-03 | Stop reason: HOSPADM

## 2023-03-02 RX ORDER — SUMATRIPTAN 50 MG/1
100 TABLET, FILM COATED ORAL ONCE
Status: DISCONTINUED | OUTPATIENT
Start: 2023-03-02 | End: 2023-03-03 | Stop reason: HOSPADM

## 2023-03-02 RX ORDER — MIDAZOLAM HYDROCHLORIDE 2 MG/2ML
2 INJECTION, SOLUTION INTRAMUSCULAR; INTRAVENOUS
Status: COMPLETED | OUTPATIENT
Start: 2023-03-02 | End: 2023-03-02

## 2023-03-02 RX ORDER — TOBRAMYCIN 1.2 G/30ML
INJECTION, POWDER, LYOPHILIZED, FOR SOLUTION INTRAVENOUS PRN
Status: DISCONTINUED | OUTPATIENT
Start: 2023-03-02 | End: 2023-03-02 | Stop reason: HOSPADM

## 2023-03-02 RX ORDER — HYDROMORPHONE HYDROCHLORIDE 2 MG/ML
0.5 INJECTION, SOLUTION INTRAMUSCULAR; INTRAVENOUS; SUBCUTANEOUS EVERY 5 MIN PRN
Status: DISCONTINUED | OUTPATIENT
Start: 2023-03-02 | End: 2023-03-02 | Stop reason: SDUPTHER

## 2023-03-02 RX ORDER — NEOSTIGMINE METHYLSULFATE 1 MG/ML
INJECTION, SOLUTION INTRAVENOUS PRN
Status: DISCONTINUED | OUTPATIENT
Start: 2023-03-02 | End: 2023-03-02 | Stop reason: SDUPTHER

## 2023-03-02 RX ORDER — OXYCODONE HYDROCHLORIDE 5 MG/1
10 TABLET ORAL PRN
Status: COMPLETED | OUTPATIENT
Start: 2023-03-02 | End: 2023-03-02

## 2023-03-02 RX ORDER — OXYCODONE HYDROCHLORIDE 10 MG/1
10 TABLET ORAL EVERY 4 HOURS PRN
Qty: 30 TABLET | Refills: 0 | Status: SHIPPED | OUTPATIENT
Start: 2023-03-02 | End: 2023-03-09

## 2023-03-02 RX ADMIN — TRANEXAMIC ACID 1000 MG: 100 INJECTION, SOLUTION INTRAVENOUS at 12:26

## 2023-03-02 RX ADMIN — CELECOXIB 200 MG: 200 CAPSULE ORAL at 09:06

## 2023-03-02 RX ADMIN — LIDOCAINE HYDROCHLORIDE 60 MG: 20 INJECTION, SOLUTION EPIDURAL; INFILTRATION; INTRACAUDAL; PERINEURAL at 10:38

## 2023-03-02 RX ADMIN — DEXAMETHASONE SODIUM PHOSPHATE 4 MG: 4 INJECTION, SOLUTION INTRAMUSCULAR; INTRAVENOUS at 11:03

## 2023-03-02 RX ADMIN — ONDANSETRON 4 MG: 2 INJECTION INTRAMUSCULAR; INTRAVENOUS at 12:26

## 2023-03-02 RX ADMIN — FENTANYL CITRATE 100 MCG: 50 INJECTION INTRAMUSCULAR; INTRAVENOUS at 10:35

## 2023-03-02 RX ADMIN — Medication 10 MG: at 15:34

## 2023-03-02 RX ADMIN — PROCHLORPERAZINE EDISYLATE 5 MG: 5 INJECTION INTRAMUSCULAR; INTRAVENOUS at 15:13

## 2023-03-02 RX ADMIN — MIDAZOLAM HYDROCHLORIDE 2 MG: 1 INJECTION, SOLUTION INTRAMUSCULAR; INTRAVENOUS at 09:39

## 2023-03-02 RX ADMIN — HYDROMORPHONE HYDROCHLORIDE 0.5 MG: 2 INJECTION, SOLUTION INTRAMUSCULAR; INTRAVENOUS; SUBCUTANEOUS at 14:06

## 2023-03-02 RX ADMIN — ASPIRIN 81 MG: 81 TABLET ORAL at 20:57

## 2023-03-02 RX ADMIN — MELOXICAM 15 MG: 7.5 TABLET ORAL at 18:25

## 2023-03-02 RX ADMIN — OXYCODONE HYDROCHLORIDE 10 MG: 5 TABLET ORAL at 23:26

## 2023-03-02 RX ADMIN — OXYCODONE HYDROCHLORIDE 10 MG: 5 TABLET ORAL at 13:52

## 2023-03-02 RX ADMIN — TRANEXAMIC ACID 1000 MG: 100 INJECTION, SOLUTION INTRAVENOUS at 10:49

## 2023-03-02 RX ADMIN — GLYCOPYRROLATE 0.4 MG: 0.2 INJECTION INTRAMUSCULAR; INTRAVENOUS at 12:26

## 2023-03-02 RX ADMIN — FENTANYL CITRATE 50 MCG: 50 INJECTION INTRAMUSCULAR; INTRAVENOUS at 12:26

## 2023-03-02 RX ADMIN — SODIUM CHLORIDE, POTASSIUM CHLORIDE, SODIUM LACTATE AND CALCIUM CHLORIDE: 600; 310; 30; 20 INJECTION, SOLUTION INTRAVENOUS at 18:27

## 2023-03-02 RX ADMIN — Medication 10 MG: at 14:47

## 2023-03-02 RX ADMIN — PROPOFOL 200 MG: 10 INJECTION, EMULSION INTRAVENOUS at 10:38

## 2023-03-02 RX ADMIN — HYDROMORPHONE HYDROCHLORIDE 0.5 MG: 2 INJECTION, SOLUTION INTRAMUSCULAR; INTRAVENOUS; SUBCUTANEOUS at 13:50

## 2023-03-02 RX ADMIN — ROCURONIUM BROMIDE 50 MG: 50 INJECTION, SOLUTION INTRAVENOUS at 10:38

## 2023-03-02 RX ADMIN — SENNOSIDES AND DOCUSATE SODIUM 1 TABLET: 8.6; 5 TABLET ORAL at 20:57

## 2023-03-02 RX ADMIN — ONDANSETRON 4 MG: 2 INJECTION INTRAMUSCULAR; INTRAVENOUS at 18:21

## 2023-03-02 RX ADMIN — CEFAZOLIN 2000 MG: 10 INJECTION, POWDER, FOR SOLUTION INTRAVENOUS at 18:13

## 2023-03-02 RX ADMIN — HYDROMORPHONE HYDROCHLORIDE 0.5 MG: 1 INJECTION, SOLUTION INTRAMUSCULAR; INTRAVENOUS; SUBCUTANEOUS at 20:57

## 2023-03-02 RX ADMIN — Medication 10 MG: at 11:26

## 2023-03-02 RX ADMIN — APREPITANT 40 MG: 40 CAPSULE ORAL at 09:39

## 2023-03-02 RX ADMIN — SODIUM CHLORIDE, SODIUM LACTATE, POTASSIUM CHLORIDE, AND CALCIUM CHLORIDE: 600; 310; 30; 20 INJECTION, SOLUTION INTRAVENOUS at 09:06

## 2023-03-02 RX ADMIN — Medication 3 MG: at 12:26

## 2023-03-02 RX ADMIN — SODIUM CHLORIDE, SODIUM LACTATE, POTASSIUM CHLORIDE, AND CALCIUM CHLORIDE: 600; 310; 30; 20 INJECTION, SOLUTION INTRAVENOUS at 13:06

## 2023-03-02 RX ADMIN — FENTANYL CITRATE 50 MCG: 50 INJECTION INTRAMUSCULAR; INTRAVENOUS at 13:12

## 2023-03-02 RX ADMIN — SODIUM CHLORIDE, PRESERVATIVE FREE 10 ML: 5 INJECTION INTRAVENOUS at 20:56

## 2023-03-02 RX ADMIN — OXYCODONE HYDROCHLORIDE 5 MG: 5 TABLET ORAL at 18:24

## 2023-03-02 RX ADMIN — METFORMIN HYDROCHLORIDE 1000 MG: 500 TABLET, EXTENDED RELEASE ORAL at 23:26

## 2023-03-02 RX ADMIN — ACETAMINOPHEN 1000 MG: 500 TABLET, FILM COATED ORAL at 18:24

## 2023-03-02 RX ADMIN — Medication 2 G: at 10:49

## 2023-03-02 RX ADMIN — HYDROMORPHONE HYDROCHLORIDE 0.5 MG: 2 INJECTION, SOLUTION INTRAMUSCULAR; INTRAVENOUS; SUBCUTANEOUS at 13:45

## 2023-03-02 RX ADMIN — HYDROMORPHONE HYDROCHLORIDE 0.5 MG: 2 INJECTION, SOLUTION INTRAMUSCULAR; INTRAVENOUS; SUBCUTANEOUS at 13:27

## 2023-03-02 ASSESSMENT — PAIN SCALES - GENERAL
PAINLEVEL_OUTOF10: 8
PAINLEVEL_OUTOF10: 3
PAINLEVEL_OUTOF10: 9
PAINLEVEL_OUTOF10: 9
PAINLEVEL_OUTOF10: 6
PAINLEVEL_OUTOF10: 8
PAINLEVEL_OUTOF10: 6
PAINLEVEL_OUTOF10: 7
PAINLEVEL_OUTOF10: 8

## 2023-03-02 ASSESSMENT — PAIN DESCRIPTION - DESCRIPTORS
DESCRIPTORS: ACHING;THROBBING
DESCRIPTORS: ACHING;SORE
DESCRIPTORS: ACHING;BURNING;THROBBING

## 2023-03-02 ASSESSMENT — LIFESTYLE VARIABLES: SMOKING_STATUS: 0

## 2023-03-02 ASSESSMENT — PAIN DESCRIPTION - LOCATION
LOCATION: HIP
LOCATION: LEG
LOCATION: HIP

## 2023-03-02 ASSESSMENT — PAIN DESCRIPTION - ORIENTATION
ORIENTATION: RIGHT

## 2023-03-02 ASSESSMENT — PAIN - FUNCTIONAL ASSESSMENT
PAIN_FUNCTIONAL_ASSESSMENT: ADULT NONVERBAL PAIN SCALE (NPVS)
PAIN_FUNCTIONAL_ASSESSMENT: 0-10

## 2023-03-02 NOTE — PROGRESS NOTES
Physical Therapy Note:    Attempted to see patient this PM in PACU for physical therapy evaluation session. Patient is currently lethargic, drowsy and unable to keep eyes open, just got pain medicine per RN. RN stated pt was staying the night and agreed pt would not be able to participate in ADL/mobility assessment at this time. Will follow and re-attempt as schedule permits/patient available.  Thank you,    Gallo Haines, PT

## 2023-03-02 NOTE — ANESTHESIA PRE PROCEDURE
Department of Anesthesiology  Preprocedure Note       Name:  Bryan Matthew   Age:  45 y.o.  :  1970                                          MRN:  [id number]         Date:  3/2/2023      Surgeon: Keli Harrison):  Elle William MD    Procedure: Procedure(s):  RIGHT TOTAL HIP  ARTHROPLASTY ANTERIOR    Medications prior to admission:   Prior to Admission medications    Medication Sig Start Date End Date Taking? Authorizing Provider   amLODIPine (NORVASC) 10 MG tablet Take 10 mg by mouth every evening    Historical Provider, MD   hydroCHLOROthiazide (MICROZIDE) 12.5 MG capsule Take 12.5 mg by mouth every evening    Historical Provider, MD   nitrofurantoin, macrocrystal-monohydrate, (MACROBID) 100 MG capsule Take 100 mg by mouth 2 times daily 3 more days left    Historical Provider, MD   traMADol [facility]) 200 MG extended release tablet Take 200 mg by mouth every evening. Historical Provider, MD   traMADol (ULTRAM) 50 MG tablet Take 50 mg by mouth every 6 hours as needed for Pain.     Historical Provider, MD   Diclofenac Sodium 100 MG TB24 Take 100 mg by mouth every evening    Historical Provider, MD   ondansetron (ZOFRAN) 8 MG tablet Take 8 mg by mouth every 8 hours as needed for Nausea or Vomiting    Historical Provider, MD   rizatriptan (MAXALT) 10 MG tablet Take 10 mg by mouth once as needed for Migraine May repeat in 2 hours if needed    Historical Provider, MD   fluticasone furoate-vilanterol (BREO ELLIPTA) 100-25 MCG/ACT inhaler Inhale 1 puff into the lungs daily as needed (Seasonal allergies)    Historical Provider, MD   diphenhydrAMINE (BENADRYL) 25 MG tablet Take 25 mg by mouth every 6 hours as needed for Sleep or Allergies    Historical Provider, MD   Coenzyme Q10 (COQ-10) 100 MG CAPS Take by mouth daily    Historical Provider, MD   Multiple Vitamins-Minerals (MULTI COMPLETE PO) Take by mouth daily    Historical Provider, MD   Potassium 95 MG TABS Take by mouth daily    Historical Provider, MD   vitamin D (CHOLECALCIFEROL) 25 MCG (1000 UT) TABS tablet Take 1,000 Units by mouth daily    Historical Provider, MD   metFORMIN (GLUCOPHAGE) 1000 MG tablet Take 1,500 mg by mouth every evening    Historical Provider, MD       Current medications:    Current Facility-Administered Medications   Medication Dose Route Frequency Provider Last Rate Last Admin   • acetaminophen (TYLENOL) tablet 1,000 mg  1,000 mg Oral Once Denilson Mccloud MD       • dexamethasone (DECADRON) injection 8 mg  8 mg IntraVENous Once Denilson Mccloud MD       • 0.9 % sodium chloride infusion   IntraVENous Continuous Denilson Mccloud MD       • sodium chloride flush 0.9 % injection 5-40 mL  5-40 mL IntraVENous 2 times per day Denilson Mccloud MD       • sodium chloride flush 0.9 % injection 5-40 mL  5-40 mL IntraVENous PRN Denilson Mccloud MD       • 0.9 % sodium chloride infusion   IntraVENous PRN Denilson Mccloud MD       • ceFAZolin (ANCEF) 2000 mg in sterile water 20 mL IV syringe  2,000 mg IntraVENous On Call to OR Denilson Mccloud MD       • albuterol (PROVENTIL) nebulizer solution 2.5 mg  2.5 mg Nebulization Q6H PRN Cecily Burns, APRN - CNP       • lidocaine 1 % injection 1 mL  1 mL IntraDERmal Once PRN Lv Hernandez MD       • lactated ringers IV soln infusion   IntraVENous Continuous Lv Hernandez  mL/hr at 03/02/23 0906 New Bag at 03/02/23 0906   • sodium chloride flush 0.9 % injection 5-40 mL  5-40 mL IntraVENous 2 times per day Lv Hernandez MD       • sodium chloride flush 0.9 % injection 5-40 mL  5-40 mL IntraVENous PRN Lv Hernandez MD       • midazolam PF (VERSED) injection 2 mg  2 mg IntraVENous Once PRN Lv Hernandez MD           Allergies:  No Known Allergies    Problem List:    Patient Active Problem List   Diagnosis Code   • Snoring R06.83   • Osteoarthritis of right hip, unspecified osteoarthritis type M16.11       Past Medical History:        Diagnosis Date   • Arthritis    • Depression     No meds   • GERD  (gastroesophageal reflux disease)     OTC meds as needed    History of snoring     Hypertension     Managed with meds    PONV (postoperative nausea and vomiting)     Seasonal allergies     Inhaler as needed- last use 2/19/23    Streptococcal arthritis of right hip ([address] 12/13/2021       Past Surgical History:        Procedure Laterality Date    GALLBLADDER SURGERY      INCISION AND DRAINAGE HIP Right     2021,2022    SHOULDER DEBRIDEMENT Right 2022    TUBAL LIGATION  2004       Social History:    Social History     Tobacco Use    Smoking status: Never    Smokeless tobacco: Never   Substance Use Topics    Alcohol use: Not Currently                                Counseling given: Not Answered      Vital Signs (Current):   Vitals:    03/02/23 0901   BP: 125/70   Pulse: 85   Resp: 16   Temp: 98 °F (36.7 °C)   TempSrc: Oral   SpO2: 98%   Weight: 184 lb (83.5 kg)                                              BP Readings from Last 3 Encounters:   03/02/23 125/70   02/20/23 (!) 147/89       NPO Status: Time of last liquid consumption: 0844 (water)                        Time of last solid consumption: 2100                        Date of last liquid consumption: 03/02/23                        Date of last solid food consumption: 03/01/23    BMI:   Wt Readings from Last 3 Encounters:   03/02/23 184 lb (83.5 kg)   02/20/23 185 lb 11.2 oz (84.2 kg)     Body mass index is 32.08 kg/m².     CBC: No results found for: WBC, RBC, HGB, HCT, MCV, RDW, PLT    CMP: No results found for: NA, K, CL, CO2, BUN, CREATININE, GFRAA, AGRATIO, LABGLOM, GLUCOSE, GLU, PROT, CALCIUM, BILITOT, ALKPHOS, AST, ALT    POC Tests:   Recent Labs     03/02/23  0856   POCGLU 88       Coags: No results found for: PROTIME, INR, APTT    HCG (If Applicable): No results found for: PREGTESTUR, PREGSERUM, HCG, HCGQUANT     ABGs: No results found for: PHART, PO2ART, JWE4DAF, RWU0HPP, BEART, [id number]     Type & Screen (If Applicable):  No results found for: LABABO, LABRH    Drug/Infectious Status (If Applicable):  No results found for: HIV, HEPCAB    COVID-19 Screening (If Applicable): No results found for: COVID19        Anesthesia Evaluation  Patient summary reviewed and Nursing notes reviewed   history of anesthetic complications: PONV. Airway: Mallampati: II  TM distance: >3 FB   Neck ROM: full  Mouth opening: > = 3 FB   Dental: normal exam         Pulmonary:Negative Pulmonary ROS and normal exam    (+) asthma:     (-) not a current smoker                           Cardiovascular:  Exercise tolerance: good (>4 METS), Echo:   The Left Ventricle is normal in size. There is no LV Thrombus seen. There    is mild concentric Left Ventricular Hypertrophy. EF= > 55% . The Left    Ventricular wall motion is normal. Normal Diastolic function.    The mitral valve is normal. There is mild mitral regurgitation.     (+) hypertension:,         Rhythm: regular  Rate: normal                    Neuro/Psych:   (+) depression/anxiety             GI/Hepatic/Renal:   (+) GERD:,           Endo/Other:                      ROS comment: PCOS Abdominal:             Vascular: Other Findings:           Anesthesia Plan      general     ASA 2       Induction: intravenous. MIPS: Postoperative opioids intended and Prophylactic antiemetics administered. Anesthetic plan and risks discussed with patient. Use of blood products discussed with patient whom consented to blood products.                      Lida Forrest MD   3/2/2023

## 2023-03-02 NOTE — H&P
Orthopaedic Hip and Knee H&P Note    CC: R hip pain    HPI:  Juanita Nageotte is a 45 y.o. female with R hip pain. Patient was previously evaluated in clinic, diagnosed with advanced hip osteoarthritis, failed conservative mgmt, and pain was affecting ADLs. Patient was indicated for surgery and attended PAT clinic along with cleared for surgery. The patient presents to [facility] for R LORENA appropriately NPO. Family bedside. ROS negative except HPI    Previous histories reviewed and unchanged    PE:  AFVSS  Gen: comfortable, laying in bed  Cardiac: nl perfusion, nl rate  Pulmonary: nlb on RA  Psych: cooperative, appropriate  MSK: RLE - (+)pain with flexion to 90 degrees/IR/ER, (+) stinchfield, (+) log roll, motor intact ehl/fhl/ta/gsc, SILT dp/sp/s/s/tibial nerve distribution, toes wwp with bcr    A/P: 45 y.o. female h/o native hip septic arthritis with I&D. Resolved with now post-traumatic R hip OA    -Ortho to admit  -Plan for R LORENA  -R/B/A extensively discussed with pt including but not limited to continued pain, bleeding, infection, need for further procedures, damage to nerves/vessels, fracture, hardware failure, blood clot, dislocation, leg length discrepancy, wound healing problems, anesthesia risks, and death. Despite these risks, the patient wished to proceed with surgery. All questions answered. -Extremity marked. Written consent confirmed. -NPO   -Please call with any questions or concerns. Brittany Morfin M.D.   Adult Hip [facility] [address]. \[facility].\"" [facility]  Cell: [phone]

## 2023-03-02 NOTE — PERIOP NOTE
TRANSFER - OUT REPORT:    Verbal report given to [facility] RN on Aakash Austin  being transferred to [facility] 0325 [id number] for routine post-op       Report consisted of patients Situation, Background, Assessment and   Recommendations(SBAR). Information from the following report(s) Nurse Handoff Report, Adult Overview, Surgery Report, Intake/Output, and MAR was reviewed with the receiving nurse. Lines:   Peripheral IV 03/02/23 Left Antecubital (Active)   Site Assessment Clean, dry & intact 03/02/23 1536   Line Status Blood return noted; Infusing 03/02/23 1536   Line Care Connections checked and tightened 03/02/23 1536   Phlebitis Assessment No symptoms 03/02/23 1536   Infiltration Assessment 0 03/02/23 1536   Alcohol Cap Used No 03/02/23 1536   Dressing Status Clean, dry & intact 03/02/23 1536   Dressing Type Transparent 03/02/23 1536        Opportunity for questions and clarification was provided. Patient transported with:   O2 @ 2 liters    VTE prophylaxis orders have been written for Aakash Austin. Patient and family given floor number and nurses name. Family updated re: pt status after security code verified.

## 2023-03-02 NOTE — ANESTHESIA POSTPROCEDURE EVALUATION
Department of Anesthesiology  Postprocedure Note    Patient: Aron Ayala  MRN: [id number]  YOB: 1970  Date of evaluation: 3/2/2023      Procedure Summary     Date: 03/02/23 Room / Location: [facility] MAIN OR  / [facility] MAIN OR    Anesthesia Start: 1032 Anesthesia Stop: 2434    Procedure: RIGHT TOTAL HIP  ARTHROPLASTY ANTERIOR (Right: Hip) Diagnosis:       Primary localized osteoarthritis of right hip      (Primary localized osteoarthritis of right hip [M16.11])    Providers: Rosmery Díaz MD Responsible Provider: Eder Valenzuela MD    Anesthesia Type: General ASA Status: 2          Anesthesia Type: General    Trisha Phase I: Trisha Score: 9    Trisha Phase II:        Anesthesia Post Evaluation    Patient location during evaluation: PACU  Patient participation: complete - patient participated  Level of consciousness: awake and alert  Airway patency: patent  Nausea & Vomiting: no nausea and no vomiting  Complications: no  Cardiovascular status: hemodynamically stable  Respiratory status: acceptable, nonlabored ventilation and spontaneous ventilation  Hydration status: euvolemic  Comments: BP (!) 112/55   Pulse 84   Temp 98 °F (36.7 °C) (Temporal)   Resp 16   Wt 184 lb (83.5 kg)   SpO2 97%   BMI 32.08 kg/m²     Multimodal analgesia pain management approach

## 2023-03-02 NOTE — ANESTHESIA PROCEDURE NOTES
Airway  Date/Time: 3/2/2023 10:40 AM  Urgency: elective    Airway not difficult    General Information and Staff    Patient location during procedure: OR  Resident/CRNA: WANDA Haque CRNA  Performed: resident/CRNA     Indications and Patient Condition  Indications for airway management: anesthesia  Spontaneous ventilation: present  Sedation level: deep  Preoxygenated: yes  Patient position: sniffing  MILS not maintained throughout  Mask difficulty assessment: vent by bag mask    Final Airway Details  Final airway type: endotracheal airway      Successful airway: ETT  Cuffed: yes   Successful intubation technique: direct laryngoscopy  Facilitating devices/methods: intubating stylet  Endotracheal tube insertion site: oral  Blade: Arun  Blade size: #4  ETT size (mm): 7.0  Cormack-Lehane Classification: grade IIb - view of arytenoids or posterior of glottis only  Measured from: lips  ETT to lips (cm): 21  Number of attempts at approach: 1  Ventilation between attempts: bag mask  Number of other approaches attempted: 0

## 2023-03-02 NOTE — PROGRESS NOTES
Occupational Therapy Note:    Chart reviewed and evaluation attempted in PACU where orders were received. Patient is currently lethargic, drowsy and unable to keep eyes open, just got pain medicine per RN. RN stated pt was staying the night and agreed pt would not be able to participate in ADL/mobility assessment at this time. Will defer to tomorrow AM to ensure safety when mobilizing.  Thank you,    Teja Macario

## 2023-03-02 NOTE — H&P
Medical Student History & Physical   Admit Date:  3/2/2023  8:26 AM   Name:  Aakash Austin   Age:  45 y.o. Sex:  female  :  1970   MRN:  [id number]   Room:  [facility] [place]    Presenting Complaint: S/p R hip arthroplasty    Reason(s) for Admission: Primary localized osteoarthritis of right hip [M16.11]  Osteoarthritis of right hip, unspecified osteoarthritis type [M16.11]     History of Present Illness:   Aakash Austin is a 45 y.o. female with medical history of R hip OA, R hip septic arthritis, HTN, and Type 2 DM who is s/p R total hip arthroplasty and bone biopsy performed by Dr. Dave Hightower. Surgery had no complications and patient is being admitted to our service for pain control, physical therapy, and medical management of chronic issues prior to discharge. 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 She failed all conservative treatment for R hip pain and elected to undergo surgery. Patient noted she received steroid injection of her R hip in late  and developed septic arthritis in the R hip short after. She received open irrigation and debridement of the R hip in 2021. She then received a R hip I&D as well as R hip aspirations throughout . Assessment & Plan:      Osteoarthritis of R hip  -S/p total R hip arthroplasty and bone biopsy  -Pain control and po antibiotics  -Physical therapy  -Prn Zofran for nausea  -Continue maintenance LR for 24 hours or until patient is eating and drinking adequately    Hypertension  -BP remaining 120s-130s/60s  -Hold home Norvasc and HCTZ  -Continue monitoring BP    Type II DM  -Glucose 88 prior to surgery  -Hold home metformin    Hospital Problems:  Principal Problem:    Osteoarthritis of right hip, unspecified osteoarthritis type  Resolved Problems:    * No resolved hospital problems.  *      Past History:     Past Medical History:   Diagnosis Date    Arthritis     Depression     No meds    GERD (gastroesophageal reflux disease)     OTC meds as needed    History of snoring     Hypertension     Managed with meds    PONV (postoperative nausea and vomiting)     Seasonal allergies     Inhaler as needed- last use 2/19/23    Streptococcal arthritis of right hip ([address] 12/13/2021       Past Surgical History:   Procedure Laterality Date    GALLBLADDER SURGERY      INCISION AND DRAINAGE HIP Right     2021,2022    SHOULDER DEBRIDEMENT Right 2022    TUBAL LIGATION  2004        Social History     Tobacco Use    Smoking status: Never    Smokeless tobacco: Never   Substance Use Topics    Alcohol use: Not Currently      Social History     Substance and Sexual Activity   Drug Use Not Currently       History reviewed. No pertinent family history. There is no immunization history on file for this patient.   No Known Allergies  Prior to Admit Medications:  Current Outpatient Medications   Medication Instructions    amLODIPine (NORVASC) 10 mg, Oral, EVERY EVENING    Coenzyme Q10 (COQ-10) 100 MG CAPS Oral, DAILY    Diclofenac Sodium 100 mg, Oral, EVERY EVENING    diphenhydrAMINE (BENADRYL) 25 mg, Oral, EVERY 6 HOURS PRN    fluticasone furoate-vilanterol (BREO ELLIPTA) 100-25 MCG/ACT inhaler 1 puff, Inhalation, DAILY PRN    hydroCHLOROthiazide (MICROZIDE) 12.5 mg, Oral, EVERY EVENING    metFORMIN (GLUCOPHAGE) 1,500 mg, Oral, EVERY EVENING    Multiple Vitamins-Minerals (MULTI COMPLETE PO) Oral, DAILY    nitrofurantoin (macrocrystal-monohydrate) (MACROBID) 100 mg, Oral, 2 TIMES DAILY, 3 more days left    ondansetron (ZOFRAN) 8 mg, Oral, EVERY 8 HOURS PRN    Potassium 95 MG TABS Oral, DAILY    rizatriptan (MAXALT) 10 mg, Oral, ONCE PRN, May repeat in 2 hours if needed    traMADol (ULTRAM ER) 200 mg, Oral, EVERY EVENING    traMADol (ULTRAM) 50 mg, Oral, EVERY 6 HOURS PRN    vitamin D (CHOLECALCIFEROL) 1,000 Units, Oral, DAILY       Objective:   Patient Vitals for the past 24 hrs:   Temp Pulse Resp BP SpO2   03/02/23 1406 -- 89 -- 130/63 100 %   03/02/23 1401 -- 85 -- 134/65 99 %   03/02/23 1356 -- 86 -- 133/67 99 %   03/02/23 1351 -- 88 -- (!) 126/58 99 %   03/02/23 1346 -- 85 -- (!) 128/59 99 %   03/02/23 1341 -- 91 -- 134/61 99 %   03/02/23 1331 -- 87 -- 130/63 99 %   03/02/23 1326 -- 87 16 134/66 98 %   03/02/23 1321 -- 83 16 133/62 97 %   03/02/23 1316 98.1 °F (36.7 °C) 88 16 128/61 96 %   03/02/23 0901 98 °F (36.7 °C) 85 16 125/70 98 %       Oxygen Therapy  SpO2: 100 %  O2 Device: Nasal cannula  Skin Assessment: Clean, dry, & intact  O2 Flow Rate (L/min): 3 L/min    Estimated body mass index is 32.08 kg/m² as calculated from the following:    Height as of 2/20/23: 5' 3.5\" (1.613 m). Weight as of this encounter: 184 lb (83.5 kg). Intake/Output Summary (Last 24 hours) at 3/2/2023 1425  Last data filed at 3/2/2023 1306  Gross per 24 hour   Intake 1000 ml   Output 250 ml   Net 750 ml         Physical Exam:  Blood pressure 130/63, pulse 89, temperature 98.1 °F (36.7 °C), temperature source Skin, resp. rate 16, weight 184 lb (83.5 kg), SpO2 100 %. Physical Exam  Vitals reviewed. Constitutional:       General: She is not in acute distress. Appearance: Normal appearance. She is not ill-appearing or toxic-appearing. Comments: Pt groggy but able to answer questions at the bedside. HENT:      Head: Normocephalic and atraumatic. Cardiovascular:      Rate and Rhythm: Normal rate and regular rhythm. Heart sounds: Normal heart sounds. Pulmonary:      Effort: Pulmonary effort is normal.      Breath sounds: Normal breath sounds. Abdominal:      General: Abdomen is flat. Bowel sounds are normal.      Palpations: Abdomen is soft. Skin:     Comments: Dressing in place on R hip. Neurological:      Mental Status: She is alert.         Recent Labs:  Recent Results (from the past 24 hour(s))   TYPE AND SCREEN    Collection Time: 03/02/23  8:38 AM Result Value Ref Range    Crossmatch expiration date 03/05/2023,2359     ABO/Rh B POSITIVE     Antibody Screen NEG    POC Sodium and Potassium    Collection Time: 03/02/23  8:54 AM   Result Value Ref Range    POC Potassium 3.7 3.5 - 5.1 MMOL/L   POCT Glucose    Collection Time: 03/02/23  8:56 AM   Result Value Ref Range    POC Glucose 88 65 - 100 mg/dL    Performed by: Katelynn    Hemoglobin and Hematocrit    Collection Time: 03/02/23  1:49 PM   Result Value Ref Range    Hemoglobin 10.5 (L) 11.7 - 15.4 g/dL    Hematocrit 33.1 (L) 35.8 - 46.3 %       Imaging studies:  NC XR TECHNOLOGIST SERVICE    Result Date: 3/2/2023  Radiology exam is complete. No Radiologist dictation. Please follow up with ordering provider. Echocardiogram:  No results found for this or any previous visit.         Orders Placed This Encounter   Medications    DISCONTD: acetaminophen (TYLENOL) tablet 1,000 mg    celecoxib (CELEBREX) capsule 200 mg    DISCONTD: dexamethasone (DECADRON) injection 8 mg    DISCONTD: 0.9 % sodium chloride infusion    DISCONTD: sodium chloride flush 0.9 % injection 5-40 mL    DISCONTD: sodium chloride flush 0.9 % injection 5-40 mL    DISCONTD: 0.9 % sodium chloride infusion    ceFAZolin (ANCEF) 2000 mg in sterile water 20 mL IV syringe     Default Settings: Auto-dosed by Weight On Call to O.R x 1 dose  Auto-dosed: Pt Wt<119.9kg-2gm, >120kg-3gm     Order Specific Question:   Antimicrobial Indications     Answer:   Surgical Prophylaxis    albuterol (PROVENTIL) nebulizer solution 2.5 mg     Order Specific Question:   Initiate RT Bronchodilator Protocol     Answer:   Yes    lactated ringers IV soln infusion    sodium chloride flush 0.9 % injection 5-40 mL    sodium chloride flush 0.9 % injection 5-40 mL    0.9 % sodium chloride infusion    DISCONTD: HYDROmorphone HCl PF (DILAUDID) injection 0.5 mg    HYDROmorphone HCl PF (DILAUDID) injection 0.5 mg    OR Linked Order Group     oxyCODONE (ROXICODONE) immediate release tablet 5 mg     oxyCODONE (ROXICODONE) immediate release tablet 10 mg    ondansetron (ZOFRAN) injection 4 mg    prochlorperazine (COMPAZINE) injection 5 mg    diphenhydrAMINE (BENADRYL) injection 12.5 mg    DISCONTD: lidocaine 1 % injection 1 mL    DISCONTD: acetaminophen (TYLENOL) tablet 1,000 mg    DISCONTD: lactated ringers IV soln infusion    DISCONTD: sodium chloride flush 0.9 % injection 5-40 mL    DISCONTD: sodium chloride flush 0.9 % injection 5-40 mL    DISCONTD: 0.9 % sodium chloride infusion    midazolam PF (VERSED) injection 2 mg    aprepitant (EMEND) capsule 40 mg    DISCONTD: tobramycin (NEBCIN) injection    DISCONTD: vancomycin (VANCOCIN) injection    DISCONTD: bupivacaine-EPINEPHrine PF (MARCAINE-w/EPINEPHRINE) 0.5% -1:[id number] injection    DISCONTD: sod chloride IRR soln 0.9 % irrigation    DISCONTD: ketorolac (TORADOL) injection    amLODIPine (NORVASC) tablet 10 mg    hydroCHLOROthiazide (MICROZIDE) capsule 12.5 mg    metFORMIN (GLUCOPHAGE) tablet 1,500 mg    SUMAtriptan (IMITREX) tablet 100 mg    ketamine (KETALAR) injection 10 mg         Signed:  DIPAK Buckley

## 2023-03-02 NOTE — PROGRESS NOTES
Orthopedic Hip & Knee Post-op Note      ORTHOPEDIC PROCEDURES:  3/02/23: R LORENA     SUBJECTIVE:   Evaluated in PACU. Still drowsy. Pain controlled. OBJECTIVE:  PHYSICAL EXAM -  Gen: comfortable, laying in bed, drowsy  Cardiovascular: normal perfusion  Pulmonary: nonlabored respirations on 2L NC  Musculoskeletal:  RLE -  aquacel dressing c/d/i, motor intact ehl/fhl/ta/gsc, SILT unable to fully assess 2/2 pts condition, palpable DP/TP with bcr      LABS: None     ASSESSMENT:   45 y.o. female with R hip OA    PLAN:  -Ortho primary  -Medicine consulted; appreciate the assistance  -WBAT RLE  -Ice hip  -PT; encourage mobilization  -Pain Control: as ordered  -Diet: regular diet  -Antibiotics: Ancef x24 hrs; oral Keflex for 7 days   -DVT prophylaxis: SCDs, ASA 81mg BID  -Please call with any questions, concerns, or changes in clinical exam     Dispo:  Anticipate D/c home tomorrow pending PT and medical clearance.    -Follow-up outpatient at [address] [place] [address] [address] with [address]. Juan J Thompson in 2 weeks for wound check    Rossana Llanos PA-C  [facility]  Cell: [phone]

## 2023-03-02 NOTE — OP NOTE
OPERATIVE NOTE     DATE:  3/2/2023     PREOPERATIVE DIAGNOSES:  1. H/o right native hip septic arthritis  2. Right hip osteoarthritis    POSTOPERATIVE DIAGNOSES:  1. H/o right native hip septic arthritis  2. Right hip osteoarthritis     PROCEDURE:   1. Right total hip arthroplasty  2. Intraoperative use and interpretation of fluoroscopy up to 1 hr  3. Insertion of antibiotic delivery device  4. Bone biopsy x1      SURGEON: Lynsey Enciso MD     FIRST ASSISTANT:   Elvia Spurling, PA-C     ANESTHESIA: General     ESTIMATED BLOOD LOSS: 200ml     DRAINS: None. COMPLICATIONS: None immediately noted. Patient tolerated procedure well. FINDINGS: Osteoarthritis of the femoral head and acetabulum, no other pathology     ANTIBIOTICS: Kefzol     SPECIMENS: None     IMPLANTS: Depuy 54mm Gription shell, 36/neutral liner, x2 6.5mm screw, Actis 2 high with 36+1.5mm ceramic head    CONDITION: Stable. INDICATION FOR PROCEDURE:  Maribel Mcconnell is a 45 y. o.F h/o right native hip septic arthritis x2 at OSH with I&D. She has been infection free for 1 year and infection work-up including labs and aspiration were negative. She presented to clinic with right hip pain. Xrays were obtained which showed hip osteoarthritis. Conservative mgmt had failed. Despite these modalities, patient continued to have significant hip pain that was affecting ADLs. The patient desired surgical intervention and was indicated for total hip arthroplasty. Benefits including improved pain control and improved mobility along with non-surgical alternative were explained to patient. Risks of surgery were extensively discussed with patient including but not limited to continued pain, dislocation, leg length discrepancy, bleeding, infection, need for further procedures, damage to nerves/vessels, fracture, hardware failure, DVT/PE, wound healing, anesthesia risks, and even death. All questions were answered.  Patient agreed and demonstrated understanding. Written consent was obtained in the office. Patient wished to proceed with surgery. DESCRIPTION OF PROCEDURE:   The patient was identified in the holding area. Informed consent was verified. The correct procedural side and site were verified and marked. The patient was brought into the operating room. Patient underwent uncomplicated general anesthesia. Patient was placed supine on the fracture table. Special care was taken to pad the bony prominences of the feet and they were secured into the [place] table. The operative site was then prepped and draped sterilely in the usual fashion. A multidisciplinary timeout was called to confirm the correct patient name, medical record, procedure, anatomic location and laterality with the surgical consent form. and the fact that preoperative antibiotics and TXA were administered. Additionally it was verified that radiographs were available and the correct operative instrumentation and implants were available. When all were in agreement, we proceeded with surgery. The previous 8cm longitudinal incision was made distal and lateral to the ASIS and excised. The tensor fascia was opened longitudinally and the muscle was swept laterally and scar tissue was debrided and a retractor was placed around the superior neck of the femur. Retractors were placed around the inferior aspect of the femoral neck. The reflected head of the rectus was released and an L-shaped capsulotomy was performed. Clear fluid was seen. No purulence. However, the capsule was debrided for exposure. Capsular tagging sutures were placed. Retractors were placed intracapsular and the hip was then externally rotated 80 degrees and the inferior capsule were released to the calcar. Next, with fluoroscopic guidance, a measured neck resection cut was performed with traction applied to the leg. Femoral head was removed with a corkscrew.  Traction was released and the extremity was externally rotated to 60 degrees. Next, we turned our attention to the acetabulum. Anterior and posterior acetabular retractors were placed after a posterior capsular release was performed. Acetabular labrum and pulvinar tissues were removed. Acetabular reaming was initiated and graduated up to the appropriate size while paying strict attention to adequate thickness of the anterior and posterior walls. The acetabulum was thoroughly irrigated with normal saline. The final acetabular shell was impacted into position and confirmed fluoroscopically after leveling the pelvis optically. Fluoroscopy was used for the component position and assurance of appropriate orientation. The two dome screws were placed after drilling. The final liner was impacted and verified to be correctly seated. Attention was directed to the femur. A trochanteric hook was applied and the extremity was externally rotated 120 degrees. A sharp Hohmann was placed over the medial calcar and the inferior capsule was released off the calcar to the level of the lesser trochanter. Scar tissue was debrided. A medial retractor was placed. The leg was dropped into extension and adduction. The superior capsule was released along with the piriformis and obturator internus under direct visualization for better exposure. A retractor was placed around the posterior aspect to the greater trochanter and the femur was elevated. Next, the box osteotome was used to initiate the opening into the proximal femur. Bone biopsy was sent. Canal finder was used along with rongeur to remove posterolateral aspect of proximal femur to prevent any fracture. A starter broach and subsequent broaching up to an appropriate size where there was no gross movement of the broach and close attention to appropriate anteversion. The broach was fully seated and calcar planing was performed. A trial head/neck was placed.  The hip was reduced and fluoroscopy was used to confirm orientation along fit of the femoral component in addition to appropriate leg length. Stability was adequately assessed with external rotation to 90 degrees along with unlocking the boot and attempting flexion to 90 degrees and internal rotation. There was no impingement. The hip was dislocated with bone hook, traction, and external rotation. The femur was re-exposed and trial components were removed. The canal was lightly irrigated and final stem impacted. The trunnion was cleaned and dried before the final head was impacted into place. The hip was reduced into a clean and dry acetabulum. The wound was copiously irrigated with normal saline and betadine 3 minute soak. The capsule was not stout enough to repair. Antibiotic beads were assembled on the back table and placed in the wound made with 1g Vancomycin and 1.2g tobramycin. The tensor fascia was reapproximated with #2 running Stratafix suture. Subcutaneous layer was reapproximated in layers with 2-0 interrupted monocryl and skin with running 2-0 nylon suture. Aquacel dressing were applied. The patient tolerated the procedure well and was transferred to PACU in stable condition. There were no immediate complications. The Physician Assistant, Stalin Vasques, was required in this surgery due to the medical complexity of the procedure. I certify that the surgical assistant surgical skill and experience operative contribution was medically necessary and invaluable throughout the course of this operation. During this procedure the assistant-at-surgery's responsibilities included helping me position the patient safely and using a combination of retractors during the exposure, soft-tissue releases and component insertion to protect the soft-tissues and surrounding neurovascular structures to prevent injury.   The surgical assistant manipulated and positioned the leg throughout the procedure when needed, and helped with visualization, suction, cautery, and instrument preparation and assembly. The surgical assistant then help with wound closure and dressing application at the end of the procedure. In addition, the physician assistant was required as there was no qualified resident available to perform these aspects of the surgical procedure. Unusual Procedure:  Modifier 22 - Increased procedural service requiring work substantially greater than typically required    Due to the patient prior surgeries x2 in the hip additionally, extra time, physician effort, and special instruments were needed for exposure of the joint. Once the joint was exposed, additional time, staff, physician effort, and instruments were used to perform the joint replacement. Increased effort by all the staff and the physician were required to complete the surgery. Once the joint replacement was completed, it required additional time and once again special instruments to close the wound due to the depth and size of the wound. The patients condition lead to unusually lengthy procedure and increased blood loss during the procedure.      POST-OP INSTRUCTIONS:  -Plan to transfer to floor for pain control and PT once cleared from PACU  -WBAT RLE with no ROM restrictions  -Ice hip  -Kefzol x24 hrs post-op; then Keflex x7 days   -Aquacel dressing in place for 7 days   -Physical therapy will be consulted for ROM/mobilization  -DVT ppx with ASA 81mg BID, SCDs  -Clinic follow-up in 2 weeks for wound check

## 2023-03-02 NOTE — PERIOP NOTE
Patient informed me that she shaved right hip and groin area. When I asked if I could assess site to ensure that site is hair free , she said no and that she prefers not to show me. She also has inner earlobe earrings in place that she is unable to remove.

## 2023-03-02 NOTE — CONSULTS
[facility] Hospitalist Consult   Admit Date:  3/2/2023  8:26 AM   Name:  Linnea Sarmiento   Age:  45 y.o. Sex:  female  :  1970   MRN:  [id number]   Room:  [facility]    Presenting Complaint: medical consultation   Reason(s) for Admission: Primary localized osteoarthritis of right hip [M16.11]  Osteoarthritis of right hip, unspecified osteoarthritis type [M16.11]     Hospitalists consulted by Charles Chan MD for: medical issue management     History of Presenting Illness:   Linnea Sarmiento is a 45 y.o. female with history of   Hypertension   Pre-diabetes   BMI of 32      who presented for right hip arthroplasty. She had it done today and the surgery was uneventful. After surgery, medical service was consulted to help manage medical issues. Patient states that BP has been under control. Her HbA1c has been 6.0. Assessment & Plan:     Principal Problem:    Osteoarthritis of right hip, unspecified osteoarthritis type  Plan:   Post-op care as per orthopedic service. Pain control as per orthopedic service. Physical therapy as per orthopedic service. Active Problems:    Essential hypertension  Plan:   BP is on the low side now. I will hold BP medications for now. Will resume BP medications when patient is eating well and BP tends to rise. Prediabetes  Plan:   Hold Metformin for now in in-patient hospital setting. Obesity (BMI 30.0-34. 9)  Plan:   Patient will be counseled for weight loss and maintenance of healthy weight when recovering from this acute illness. I have discussed the plan of care with patient and boyfriend at bedside. Diet:  No diet orders on file  VTE prophylaxis: as per orthopedic service. Code status: Full Code    Principal Problem:    Osteoarthritis of right hip, unspecified osteoarthritis type  Active Problems:    Essential hypertension    Prediabetes    Obesity (BMI 30.0-34. 9)  Resolved Problems:    * No resolved hospital problems. *      Past History:  Past Medical History:   Diagnosis Date    Arthritis     Depression     No meds    GERD (gastroesophageal reflux disease)     OTC meds as needed    History of snoring     Hypertension     Managed with meds    PONV (postoperative nausea and vomiting)     Seasonal allergies     Inhaler as needed- last use 2/19/23    Streptococcal arthritis of right hip ([address] 12/13/2021       Past Surgical History:   Procedure Laterality Date    GALLBLADDER SURGERY      INCISION AND DRAINAGE HIP Right     2021,2022    SHOULDER DEBRIDEMENT Right 2022    TUBAL LIGATION  2004        Social History     Tobacco Use    Smoking status: Never    Smokeless tobacco: Never   Substance Use Topics    Alcohol use: Not Currently      Social History     Substance and Sexual Activity   Drug Use Not Currently       History reviewed. No pertinent family history. There is no immunization history on file for this patient.   No Known Allergies   Current Facility-Administered Medications   Medication Dose Route Frequency    albuterol (PROVENTIL) nebulizer solution 2.5 mg  2.5 mg Nebulization Q6H PRN    lactated ringers IV soln infusion   IntraVENous Continuous    sodium chloride flush 0.9 % injection 5-40 mL  5-40 mL IntraVENous 2 times per day    sodium chloride flush 0.9 % injection 5-40 mL  5-40 mL IntraVENous PRN    0.9 % sodium chloride infusion   IntraVENous PRN    ondansetron (ZOFRAN) injection 4 mg  4 mg IntraVENous Once PRN    prochlorperazine (COMPAZINE) injection 5 mg  5 mg IntraVENous Once PRN    diphenhydrAMINE (BENADRYL) injection 12.5 mg  12.5 mg IntraVENous Once PRN    amLODIPine (NORVASC) tablet 10 mg  10 mg Oral QPM    hydroCHLOROthiazide (MICROZIDE) capsule 12.5 mg  12.5 mg Oral QPM    metFORMIN (GLUCOPHAGE) tablet 1,500 mg  1,500 mg Oral QPM    SUMAtriptan (IMITREX) tablet 100 mg  100 mg Oral Once    ketamine (KETALAR) injection 10 mg  10 mg IntraVENous Q10 Min PRN       Objective:   Patient Vitals for the past 24 hrs:   Temp Pulse Resp BP SpO2   03/02/23 1426 -- (!) 101 -- 125/64 98 %   03/02/23 1421 -- 89 -- 122/63 100 %   03/02/23 1416 -- 89 -- 128/61 100 %   03/02/23 1411 -- 98 -- 134/63 100 %   03/02/23 1406 -- 89 -- 130/63 100 %   03/02/23 1401 -- 85 -- 134/65 99 %   03/02/23 1356 -- 86 -- 133/67 99 %   03/02/23 1351 -- 88 -- (!) 126/58 99 %   03/02/23 1346 -- 85 -- (!) 128/59 99 %   03/02/23 1341 -- 91 -- 134/61 99 %   03/02/23 1331 -- 87 -- 130/63 99 %   03/02/23 1326 -- 87 16 134/66 98 %   03/02/23 1321 -- 83 16 133/62 97 %   03/02/23 1316 98.1 °F (36.7 °C) 88 16 128/61 96 %   03/02/23 0901 98 °F (36.7 °C) 85 16 125/70 98 %       Oxygen Therapy  SpO2: 98 %  O2 Device: Nasal cannula  Skin Assessment: Clean, dry, & intact  O2 Flow Rate (L/min): 3 L/min    Estimated body mass index is 32.08 kg/m² as calculated from the following:    Height as of 2/20/23: 5' 3.5\" (1.613 m). Weight as of this encounter: 184 lb (83.5 kg). Intake/Output Summary (Last 24 hours) at 3/2/2023 1456  Last data filed at 3/2/2023 1306  Gross per 24 hour   Intake 1000 ml   Output 250 ml   Net 750 ml         Physical Exam:    Blood pressure 125/64, pulse (!) 101, temperature 98.1 °F (36.7 °C), temperature source Skin, resp. rate 16, weight 184 lb (83.5 kg), SpO2 98 %. General:    Well nourished. BMI of 32. Patient is alert, oriented. Patient is lying flat in bed. She is answering questions well. Not pale. Not icteric. Head:  Normocephalic, atraumatic  Eyes:  Sclerae appear normal.  Pupils equally round. ENT:  Nares appear normal.  Moist oral mucosa  Neck:  No restricted ROM. Trachea midline   CV:   RRR. No m/r/g. No jugular venous distension. Lungs:   CTAB. No wheezing, rhonchi, or rales. Symmetric expansion. Abdomen:   Soft, nontender, mildly distended. Extremities: No cyanosis or clubbing. No edema, right hip under dressing and with some pain with local pressure. Skin:     No rashes and normal coloration. Warm and dry. Neuro:  CN II-XII grossly intact. Sensation intact. Psych:  Normal mood and affect. I have personally reviewed labs and tests showing:  Recent Labs:  Recent Results (from the past 24 hour(s))   TYPE AND SCREEN    Collection Time: 03/02/23  8:38 AM   Result Value Ref Range    Crossmatch expiration date 03/05/2023,2359     ABO/Rh B POSITIVE     Antibody Screen NEG    POC Sodium and Potassium    Collection Time: 03/02/23  8:54 AM   Result Value Ref Range    POC Potassium 3.7 3.5 - 5.1 MMOL/L   POCT Glucose    Collection Time: 03/02/23  8:56 AM   Result Value Ref Range    POC Glucose 88 65 - 100 mg/dL    Performed by: Katelynn    Hemoglobin and Hematocrit    Collection Time: 03/02/23  1:49 PM   Result Value Ref Range    Hemoglobin 10.5 (L) 11.7 - 15.4 g/dL    Hematocrit 33.1 (L) 35.8 - 46.3 %       I have personally reviewed imaging studies showing:  NC XR TECHNOLOGIST SERVICE    Result Date: 3/2/2023  Radiology exam is complete. No Radiologist dictation. Please follow up with ordering provider. Echocardiogram:  No results found for this or any previous visit. Signed:  Angel Etienne MD    Part of this note may have been written by using a voice dictation software. The note has been proof read but may still contain some grammatical/other typographical errors.

## 2023-03-03 VITALS
TEMPERATURE: 98.6 F | HEIGHT: 63 IN | RESPIRATION RATE: 18 BRPM | WEIGHT: 180 LBS | DIASTOLIC BLOOD PRESSURE: 66 MMHG | HEART RATE: 75 BPM | BODY MASS INDEX: 31.89 KG/M2 | SYSTOLIC BLOOD PRESSURE: 121 MMHG | OXYGEN SATURATION: 97 %

## 2023-03-03 LAB
ANION GAP SERPL CALC-SCNC: 3 MMOL/L (ref 2–11)
BACTERIA SPEC CULT: NORMAL
BUN SERPL-MCNC: 13 MG/DL (ref 6–23)
CALCIUM SERPL-MCNC: 9.2 MG/DL (ref 8.3–10.4)
CHLORIDE SERPL-SCNC: 105 MMOL/L (ref 101–110)
CO2 SERPL-SCNC: 28 MMOL/L (ref 21–32)
CREAT SERPL-MCNC: 0.9 MG/DL (ref 0.6–1)
GLUCOSE SERPL-MCNC: 121 MG/DL (ref 65–100)
POTASSIUM SERPL-SCNC: 4.1 MMOL/L (ref 3.5–5.1)
SERVICE CMNT-IMP: NORMAL
SODIUM SERPL-SCNC: 136 MMOL/L (ref 133–143)

## 2023-03-03 PROCEDURE — 97530 THERAPEUTIC ACTIVITIES: CPT

## 2023-03-03 PROCEDURE — 97535 SELF CARE MNGMENT TRAINING: CPT

## 2023-03-03 PROCEDURE — 97161 PT EVAL LOW COMPLEX 20 MIN: CPT

## 2023-03-03 PROCEDURE — 97165 OT EVAL LOW COMPLEX 30 MIN: CPT

## 2023-03-03 PROCEDURE — 36415 COLL VENOUS BLD VENIPUNCTURE: CPT

## 2023-03-03 PROCEDURE — 2580000003 HC RX 258: Performed by: ORTHOPAEDIC SURGERY

## 2023-03-03 PROCEDURE — 6360000002 HC RX W HCPCS: Performed by: ORTHOPAEDIC SURGERY

## 2023-03-03 PROCEDURE — 80048 BASIC METABOLIC PNL TOTAL CA: CPT

## 2023-03-03 PROCEDURE — 6370000000 HC RX 637 (ALT 250 FOR IP): Performed by: ORTHOPAEDIC SURGERY

## 2023-03-03 RX ORDER — CEPHALEXIN 500 MG/1
500 CAPSULE ORAL 4 TIMES DAILY
Qty: 28 CAPSULE | Refills: 0 | Status: SHIPPED | OUTPATIENT
Start: 2023-03-03 | End: 2023-03-10

## 2023-03-03 RX ADMIN — CEFAZOLIN 2000 MG: 10 INJECTION, POWDER, FOR SOLUTION INTRAVENOUS at 03:20

## 2023-03-03 RX ADMIN — SENNOSIDES AND DOCUSATE SODIUM 1 TABLET: 8.6; 5 TABLET ORAL at 08:36

## 2023-03-03 RX ADMIN — OXYCODONE HYDROCHLORIDE 10 MG: 5 TABLET ORAL at 07:17

## 2023-03-03 RX ADMIN — ACETAMINOPHEN 1000 MG: 500 TABLET, FILM COATED ORAL at 03:19

## 2023-03-03 RX ADMIN — ASPIRIN 81 MG: 81 TABLET ORAL at 08:36

## 2023-03-03 RX ADMIN — OXYCODONE HYDROCHLORIDE 10 MG: 5 TABLET ORAL at 11:14

## 2023-03-03 RX ADMIN — ACETAMINOPHEN 1000 MG: 500 TABLET, FILM COATED ORAL at 11:15

## 2023-03-03 RX ADMIN — SODIUM CHLORIDE, PRESERVATIVE FREE 10 ML: 5 INJECTION INTRAVENOUS at 08:36

## 2023-03-03 RX ADMIN — OXYCODONE HYDROCHLORIDE 10 MG: 5 TABLET ORAL at 03:26

## 2023-03-03 RX ADMIN — ONDANSETRON 4 MG: 4 TABLET, ORALLY DISINTEGRATING ORAL at 03:26

## 2023-03-03 RX ADMIN — MELOXICAM 15 MG: 7.5 TABLET ORAL at 08:36

## 2023-03-03 ASSESSMENT — PAIN SCALES - GENERAL
PAINLEVEL_OUTOF10: 7
PAINLEVEL_OUTOF10: 5
PAINLEVEL_OUTOF10: 7
PAINLEVEL_OUTOF10: 3
PAINLEVEL_OUTOF10: 7
PAINLEVEL_OUTOF10: 6
PAINLEVEL_OUTOF10: 8
PAINLEVEL_OUTOF10: 7

## 2023-03-03 ASSESSMENT — HOOS JR
GOING UP OR DOWN STAIRS: 4
HOOS JR RAW SCORE: 16
HOOS JR RAW SCORE: 16
RISING FROM SITTING: 3
WALKING ON UNEVEN SURFACE: 4
HOOS JR TOTAL INTERVAL SCORE: 39.902
LYING IN BED (TURNING OVER, MAINTAINING HIP POSITION): 2
SITTING: 1
BENDING TO THE FLOOR TO PICK UP OBJECT: 2

## 2023-03-03 ASSESSMENT — PAIN DESCRIPTION - DESCRIPTORS
DESCRIPTORS: ACHING;THROBBING
DESCRIPTORS: ACHING;THROBBING
DESCRIPTORS: SORE
DESCRIPTORS: ACHING;SHARP;SORE

## 2023-03-03 ASSESSMENT — PAIN DESCRIPTION - ORIENTATION
ORIENTATION: RIGHT

## 2023-03-03 ASSESSMENT — PAIN DESCRIPTION - LOCATION
LOCATION: HIP

## 2023-03-03 NOTE — PROGRESS NOTES
Orthopedic Hip & Knee Post-op Note      ORTHOPEDIC PROCEDURES:  3/02/23: R LORENA     SUBJECTIVE:   Alline Broccoli. Pain well-controlled. Hgb 10.5. Cr 0.90. Good I/Os. Denies CP/SOB. OBJECTIVE:  PHYSICAL EXAM -  Gen: comfortable, laying in bed, drowsy  Cardiovascular: normal perfusion  Pulmonary: nonlabored respirations on room air  Musculoskeletal:  RLE -  aquacel dressing c/d/i, motor intact ehl/fhl/ta/gsc, SILT with dp/sp/s/s/tibial nerve distribution, palpable DP/TP with bcr      LABS: Hgb 10.5. Cr 0.90.     ASSESSMENT:   45 y.o. female with R hip OA    PLAN:  -Ortho primary  -Medicine consulted; appreciate the assistance  -WBAT RLE  -Ice hip  -PT; encourage mobilization  -Pain Control: as ordered  -Diet: regular diet  -Antibiotics: Ancef completed; oral Keflex for 7 days  Postop-anemia; 10.5, asymptomatic   -DVT prophylaxis: SCDs, ASA 81mg BID  -Please call with any questions, concerns, or changes in clinical exam     Dispo:  Anticipate D/c home today pending PT and medical clearance.    -Follow-up outpatient at [address] [place] [address] [address] with [address] Juan J Thompson in 2 weeks for wound check    AJIT ReaganC  [facility]  Cell: [phone]

## 2023-03-03 NOTE — THERAPY EVALUATION
ACUTE PHYSICAL THERAPY GOALS:   (Developed with and agreed upon by patient and/or caregiver.)    (1.) Hayden De La Torre will transfer from bed to chair and chair to bed with INDEPENDENT using the least restrictive device within 7 treatment day(s). (2.) Hayden De La Torre will ambulate with MODIFIED INDEPENDENCE for 1000 feet with the least restrictive device within 7 treatment day(s). (3.) Hayden De La Torre will perform standing static and dynamic balance activities x 15 minutes with MODIFIED INDEPENDENCE to improve safety within 7 treatment day(s).         PHYSICAL THERAPY JOINT CAMP: TOTAL HIP ARTHROPLASTY Initial Assessment, Daily Note, and AM  (Link to Caseload Tracking: PT Visit Days : 1  Acknowledge Orders  Time In/Out  PT Charge Capture  Rehab Caseload Tracker  Episode   Hayden De La Torre is a 45 y.o. female   PRIMARY DIAGNOSIS: Osteoarthritis of right hip, unspecified osteoarthritis type  Primary localized osteoarthritis of right hip [M16.11]  Osteoarthritis of right hip, unspecified osteoarthritis type [M16.11]  Procedure(s) (LRB):  RIGHT TOTAL HIP  ARTHROPLASTY ANTERIOR (Right)  1 Day Post-Op  Reason for Referral: Pain in Right Hip (M25.551)  Stiffness of Right Hip, Not elsewhere classified (M25.651)  Difficulty in walking, Not elsewhere classified (R26.2)  Outpatient in a bed: Payor: aPras Dixon / Plan: [facility] / Product Type: *No Product type* /     REHAB RECOMMENDATIONS:   Recommendation to date pending progress:  Setting:  Home Health Therapy    Equipment:    None     GAIT: I Mod I S SBA CGA Min Mod Max Total  NT x2 Comments:   Level of Assistance [] [] [] [x] [] [] [] [] [] [] []    Weightbearing Status  Right Lower Extremity Weight Bearing: Weight Bearing As Tolerated    Distance  500  feet    Gait Quality Decreased guerline , Decreased step clearance, and Decreased step length    DME Rolling Walker     Stairs      Ramp     I=Independent, Mod I=Modified Independent, S=Supervision, SBA=Standby Assistance, CGA=Contact Guard Assistance,   Min=Minimal Assistance, Mod=Moderate Assistance, Max=Maximal Assistance, Total=Total Assistance, NT=Not Tested      ASSESSMENT:   ASSESSMENT:  Ms. Latasha Bryson  is a 45year old female who presents POD#1 R LORENA with anterior approach by Dr. Kate Guzman. This is pt's third hip surgery. This date pt performs mobility including bed mobility, transfers, standing balance tasks, and ambulation x500ft with SBA and RW. Pt presents as functioning below her baseline, with deficits in mobility including transfers, gait, balance, and activity tolerance. Pt will benefit from skilled therapy services to address stated deficits to promote return to highest level of function, independence, and safety. Will continue to follow.      Outcome Measure:   HOOS-JR:  Total Raw Score (0-24 Scale): 16    SUBJECTIVE:   Ms. Latasha Bryson states, \"I can't wait to get my life back\"     Home Environment/Prior Level of Function Lives With: Significant other, Daughter  Type of Home: House  Home Layout: One level  Home Access: Stairs to enter without rails  Entrance Stairs - Number of Steps: 1  Home Equipment: Walker, rolling (shower chair, BSC)  Has the patient had two or more falls in the past year or any fall with injury in the past year?: No  ADL Assistance: Independent  Homemaking Assistance: Independent  Ambulation Assistance: Independent  Transfer Assistance: Independent    OBJECTIVE:     PAIN: VITAL SIGNS: LINES/DRAINS:   Pre Treatment: 8/10, R hip         Post Treatment: Same, RN aware Vitals    VSS    Oxygen   Room air     None    RESTRICTIONS/PRECAUTIONS:  Restrictions/Precautions: Weight Bearing, Fall Risk  Right Lower Extremity Weight Bearing: Weight Bearing As Tolerated        Restrictions/Precautions: Weight Bearing, Fall Risk        LOWER EXTREMITY GROSS EVALUATION:  RIGHT LE   Within Functional Limits   Abnormal   Comments   Strength [] [x]     Range of Motion [] [x]        LEFT LE Within Functional Limits   Abnormal   Comments   Strength [x] []     Range of Motion [x] []       UPPER EXTREMITY GROSS EVALUATION:     Within Functional Limits   Abnormal   Comments   Strength [x] []    Range of Motion [x] []      SENSATION  Sensation [x]       COGNITION/  PERCEPTION: Intact Impaired (Comments):   Orientation [x]     Vision [x]     Hearing [x]     Cognition  [x]       MOBILITY: I Mod I S SBA CGA Min Mod Max Total  NT x2 Comments:   Bed Mobility    Rolling [] [] [] [] [] [] [] [] [] [x] []    Supine to Sit [] [] [] [x] [] [] [] [] [] [] []    Scooting [] [] [] [x] [] [] [] [] [] [] []    Sit to Supine [] [] [] [] [] [] [] [] [] [x] []    Transfers    Sit to Stand [] [] [] [x] [] [] [] [] [] [] []    Bed to Chair [] [] [] [x] [] [] [] [] [] [] []    Stand to Sit [] [] [] [x] [] [] [] [] [] [] []    Stand Pivot [] [] [] [x] [] [] [] [] [] [] []    Toilet [] [] [] [] [] [] [] [] [] [x] []     [] [] [] [] [] [] [] [] [] [] []    I=Independent, Mod I=Modified Independent, S=Supervision, SBA=Standby Assistance, CGA=Contact Guard Assistance,   Min=Minimal Assistance, Mod=Moderate Assistance, Max=Maximal Assistance, Total=Total Assistance, NT=Not Tested    BALANCE: Good Fair+ Fair Fair- Poor NT Comments   Sitting Static [x] [] [] [] [] []    Sitting Dynamic [x] [] [] [] [] []              Standing Static [] [x] [] [] [] []    Standing Dynamic [] [x] [] [] [] []      PLAN:   FREQUENCY AND DURATION: BID for duration of hospital stay or until stated goals are met, whichever comes first.    THERAPY PROGNOSIS: Excellent    PROBLEM LIST:   (Skilled intervention is medically necessary to address:)  Decreased ADL/Functional Activities, Decreased Activity Tolerance, Decreased AROM/PROM, Decreased Gait Ability, Decreased Strength, Decreased Transfer Abilities, and Increased Pain   INTERVENTIONS PLANNED:   (Benefits and precautions of physical therapy have been discussed with the patient.)  Therapeutic Activity, Therapeutic Exercise/HEP, Neuromuscular Re-education, Gait Training, Modalities, and Education       TREATMENT:   EVALUATION: LOW COMPLEXITY: (Untimed Charge)    TREATMENT:   Therapeutic Activity (25 Minutes): Therapeutic activity included Supine to Sit, Scooting, Lateral Scooting, Transfer Training, Ambulation on level ground, Sitting balance , and Standing balance to improve functional Activity tolerance, Balance, Mobility, Strength, and ROM. TREATMENT GRID:  THERAPEUTIC  EXERCISES: DATE:   DATE:   DATE:      AM PM AM PM AM PM    [] [] [] [] [] []   Ankle Pumps         Quad Sets         Gluteal Sets         Hip Abd/ADduction         Knee Slides         Short Arc Quads         Long Arc Quads                                    B = bilateral; AA = active assistive; A = active; P = passive      EDUCATION:    EDUCATION:  [] Home Exercises  [x] Fall Precautions  [] Hip Precautions  [x] D/C Instruction Review [] Hip Prosthesis Review  [x] Walker Management/Safety  [x] Adaptive Equipment as Needed     AFTER TREATMENT PRECAUTIONS: Bed/Chair Locked, Call light within reach, Chair, Needs within reach, and RN notified    INTERDISCIPLINARY COLLABORATION:  RN/ PCT and OT/ [facility]OPE[facility]    Compliance with Program/Exercises: Will assess as treatment progresses. Recommendations/Intent for next treatment session:  Treatment next visit will focus on increasing Ms. Germaine's independence with bed mobility, transfers, gait training, strength/ROM exercises, modalities for pain, and patient education.      TIME IN/OUT:  Time In: 8119  Time Out: 9974  Minutes: [address], PT

## 2023-03-03 NOTE — PLAN OF CARE
Problem: Pain  Goal: Verbalizes/displays adequate comfort level or baseline comfort level  Outcome: Progressing     Problem: Discharge Planning  Goal: Discharge to home or other facility with appropriate resources  Outcome: Progressing  Flowsheets (Taken 3/2/2023 1740 by Wang York RN)  Discharge to home or other facility with appropriate resources:   Identify barriers to discharge with patient and caregiver   Arrange for needed discharge resources and transportation as appropriate     Problem: ABCDS Injury Assessment  Goal: Absence of physical injury  Outcome: Progressing     Problem: Safety - Adult  Goal: Free from fall injury  Outcome: Progressing

## 2023-03-03 NOTE — PROGRESS NOTES
Hospitalist Progress Note   Admit Date:  3/2/2023  8:26 AM   Name:  Rachell Quiroga   Age:  45 y.o. Sex:  female  :  1970   MRN:  [id number]   Room:  3/    Presenting Complaint: No chief complaint on file. Reason(s) for Admission: Primary localized osteoarthritis of right hip [M16.11]  Osteoarthritis of right hip, unspecified osteoarthritis type [M16.11]     Hospital Course:     Rachell Quiroga is a 45 y.o. female with history of   Hypertension   Pre-diabetes   BMI of 32       who presented for right hip arthroplasty. She had it done today and the surgery was uneventful. After surgery, medical service was consulted to help manage medical issues. Patient states that BP has been under control. Her HbA1c has been 6.0. Subjective & 24hr Events (23):     3/3/23   Patient rested well overnight. She feels more comfortable this morning. She ate well. She has been working with physical therapy. Assessment & Plan:     Principal Problem:    Osteoarthritis of right hip, unspecified osteoarthritis type  Plan:   Post-op care as per orthopedic service. Pain control as per orthopedic service. Physical therapy as per orthopedic service. Active Problems:    Essential hypertension  Plan:   BP is controlled. Resume home medications for hypertension. Prediabetes  Plan:   Hold Metformin for now in in-patient hospital setting. Patient can resume Metformin after hospital discharge. Obesity (BMI 30.0-34. 9)  Plan:   Patient is for weight loss and maintenance of healthy weight when recovering from this acute illness. I have discussed the plan of care with patient and boyfriend at bedside. I have objection from medical standpoint for patient to be discharged today. PT/OT evals and PPD needed/ordered? Yes  Diet:  ADULT DIET; Regular  VTE prophylaxis: aspirin 81 mg po bid as per orthopedic service.    Code status: Prior    [facility] Problems:  Principal Problem:    Osteoarthritis of right hip, unspecified osteoarthritis type  Active Problems:    Essential hypertension    Prediabetes    Obesity (BMI 30.0-34. 9)  Resolved Problems:    * No resolved hospital problems.  *      Objective:   Patient Vitals for the past 24 hrs:   Temp Pulse Resp BP SpO2   03/03/23 0743 98.6 °F (37 °C) 75 18 121/66 97 %   03/03/23 0412 98.4 °F (36.9 °C) 85 18 110/84 97 %   03/03/23 0356 -- -- 18 -- --   03/02/23 2356 -- -- 18 -- --   03/02/23 2333 97.9 °F (36.6 °C) 90 17 117/72 97 %   03/02/23 2127 -- -- 18 -- --   03/02/23 1923 97.5 °F (36.4 °C) 99 18 (!) 126/56 99 %   03/02/23 1824 -- -- 18 -- --   03/02/23 1747 97.7 °F (36.5 °C) 81 20 121/77 97 %   03/02/23 1656 -- 78 -- 118/68 98 %   03/02/23 1651 -- (!) 106 -- 118/68 96 %   03/02/23 1646 -- 68 -- 110/64 96 %   03/02/23 1641 -- 75 -- 122/63 96 %   03/02/23 1636 -- 97 -- (!) 122/59 98 %   03/02/23 1631 -- (!) 122 -- (!) 112/56 98 %   03/02/23 1626 -- 50 -- (!) 115/56 96 %   03/02/23 1621 -- 54 -- 113/62 97 %   03/02/23 1616 -- 98 -- 99/60 96 %   03/02/23 1611 -- 84 -- (!) 112/55 97 %   03/02/23 1606 -- 92 -- 117/60 96 %   03/02/23 1601 -- 81 -- 121/61 95 %   03/02/23 1556 -- 93 -- (!) 118/56 96 %   03/02/23 1551 -- 87 -- (!) 131/58 96 %   03/02/23 1546 -- 95 -- (!) 120/59 96 %   03/02/23 1541 -- (!) 106 -- (!) 123/59 95 %   03/02/23 1536 98 °F (36.7 °C) 76 16 (!) 115/57 95 %   03/02/23 1531 -- 80 -- (!) 109/56 95 %   03/02/23 1526 -- 85 -- (!) 119/58 95 %   03/02/23 1521 -- 100 -- 123/60 96 %   03/02/23 1516 -- 92 -- (!) 119/56 (!) 89 %   03/02/23 1511 -- 95 -- 134/64 97 %   03/02/23 1506 -- 91 -- (!) 124/59 97 %   03/02/23 1501 -- 83 -- 129/65 98 %   03/02/23 1456 -- 81 -- 130/65 97 %   03/02/23 1451 -- 88 -- 139/68 100 %   03/02/23 1446 -- (!) 101 -- 131/61 100 %   03/02/23 1441 -- 96 -- -- 92 %   03/02/23 1436 -- 98 -- (!) 142/92 100 %   03/02/23 1431 -- (!) 101 -- (!) 141/75 100 %   03/02/23 1426 -- (!) 101 -- 125/64 98 %   03/02/23 1421 -- 89 -- 122/63 100 %   03/02/23 1416 -- 89 -- 128/61 100 %   03/02/23 1411 -- 98 -- 134/63 100 %   03/02/23 1406 -- 89 -- 130/63 100 %   03/02/23 1401 -- 85 -- 134/65 99 %   03/02/23 1356 -- 86 -- 133/67 99 %   03/02/23 1351 -- 88 -- (!) 126/58 99 %   03/02/23 1346 -- 85 -- (!) 128/59 99 %   03/02/23 1341 -- 91 -- 134/61 99 %   03/02/23 1331 -- 87 -- 130/63 99 %   03/02/23 1326 -- 87 16 134/66 98 %   03/02/23 1321 -- 83 16 133/62 97 %   03/02/23 1316 98.1 °F (36.7 °C) 88 16 128/61 96 %       Oxygen Therapy  SpO2: 97 %  Pulse via Oximetry: 80 beats per minute  Pulse Oximeter Device Mode: Intermittent  O2 Device: None (Room air)  Skin Assessment: Clean, dry, & intact  O2 Flow Rate (L/min): 2 L/min    Estimated body mass index is 31.89 kg/m² as calculated from the following:    Height as of this encounter: 5' 3\" (1.6 m). Weight as of this encounter: 180 lb (81.6 kg). Intake/Output Summary (Last 24 hours) at 3/3/2023 0907  Last data filed at 3/3/2023 0837  Gross per 24 hour   Intake 1120 ml   Output 250 ml   Net 870 ml         Physical Exam:     Blood pressure 121/66, pulse 75, temperature 98.6 °F (37 °C), temperature source Oral, resp. rate 18, height 5' 3\" (1.6 m), weight 180 lb (81.6 kg), SpO2 97 %. General:          Well nourished. BMI of 32. Patient is alert, oriented. Patient is sitting up in bed. She is talking well and she is eating. Head:               Normocephalic, atraumatic  Eyes:               Sclerae appear normal.  Pupils equally round. ENT:                Nares appear normal.  Moist oral mucosa  Neck:               No restricted ROM. Trachea midline   CV:                  RRR. No m/r/g. No jugular venous distension. Lungs:             CTAB. No wheezing, rhonchi, or rales. Symmetric expansion. Abdomen:        Soft, nontender, mildly distended. Extremities:     No cyanosis or clubbing.   No edema, right hip under dressing and with some pain with local pressure. Skin:                No rashes and normal coloration. Warm and dry. Neuro:             CN II-XII grossly intact. Sensation intact. Psych:             Normal mood and affect.       I have personally reviewed labs and tests:  Recent Labs:  Recent Results (from the past 48 hour(s))   TYPE AND SCREEN    Collection Time: 03/02/23  8:38 AM   Result Value Ref Range    Crossmatch expiration date 03/05/2023,2359     ABO/Rh B POSITIVE     Antibody Screen NEG    POC Sodium and Potassium    Collection Time: 03/02/23  8:54 AM   Result Value Ref Range    POC Potassium 3.7 3.5 - 5.1 MMOL/L   POCT Glucose    Collection Time: 03/02/23  8:56 AM   Result Value Ref Range    POC Glucose 88 65 - 100 mg/dL    Performed by: Katelynn    Culture, Tissue    Collection Time: 03/02/23 12:10 PM    Specimen: Femur   Result Value Ref Range    Special Requests RIGHT  FEMUR  BONE        Gram stain PENDING     Culture NO GROWTH 1 DAY     Hemoglobin and Hematocrit    Collection Time: 03/02/23  1:49 PM   Result Value Ref Range    Hemoglobin 10.5 (L) 11.7 - 15.4 g/dL    Hematocrit 33.1 (L) 35.8 - 46.3 %   Basic Metabolic Panel    Collection Time: 03/03/23  5:05 AM   Result Value Ref Range    Sodium 136 133 - 143 mmol/L    Potassium 4.1 3.5 - 5.1 mmol/L    Chloride 105 101 - 110 mmol/L    CO2 28 21 - 32 mmol/L    Anion Gap 3 2 - 11 mmol/L    Glucose 121 (H) 65 - 100 mg/dL    BUN 13 6 - 23 MG/DL    Creatinine 0.90 0.6 - 1.0 MG/DL    Est, Glom Filt Rate >60 >60 ml/min/1.73m2    Calcium 9.2 8.3 - 10.4 MG/DL       I have personally reviewed imaging studies:  Other Studies:  XR PELVIS (1-2 VIEWS)   Final Result   Post right hip replacement      [facility] XR TECHNOLOGIST SERVICE   Final Result          Current Meds:  Current Facility-Administered Medications   Medication Dose Route Frequency    albuterol (PROVENTIL) nebulizer solution 2.5 mg  2.5 mg Nebulization Q6H PRN    lactated ringers IV soln infusion   IntraVENous Continuous    sodium chloride flush 0.9 % injection 5-40 mL  5-40 mL IntraVENous 2 times per day    sodium chloride flush 0.9 % injection 5-40 mL  5-40 mL IntraVENous PRN    0.9 % sodium chloride infusion   IntraVENous PRN    acetaminophen (TYLENOL) tablet 1,000 mg  1,000 mg Oral q8h    meloxicam (MOBIC) tablet 15 mg  15 mg Oral Daily    oxyCODONE (ROXICODONE) immediate release tablet 5 mg  5 mg Oral Q4H PRN    ondansetron (ZOFRAN-ODT) disintegrating tablet 4 mg  4 mg Oral Q8H PRN    Or    ondansetron (ZOFRAN) injection 4 mg  4 mg IntraVENous Q6H PRN    aspirin EC tablet 81 mg  81 mg Oral BID    sennosides-docusate sodium (SENOKOT-S) 8.6-50 MG tablet 1 tablet  1 tablet Oral BID    [Held by provider] amLODIPine (NORVASC) tablet 10 mg  10 mg Oral QPM    [Held by provider] hydroCHLOROthiazide (MICROZIDE) capsule 12.5 mg  12.5 mg Oral QPM    metFORMIN (GLUCOPHAGE-XR) extended release tablet 1,000 mg  1,000 mg Oral QPM    SUMAtriptan (IMITREX) tablet 100 mg  100 mg Oral Once    ketamine (KETALAR) injection 10 mg  10 mg IntraVENous Q10 Min PRN    oxyCODONE (ROXICODONE) immediate release tablet 10 mg  10 mg Oral Q4H PRN       Signed:  Nancy Perez MD    Part of this note may have been written by using a voice dictation software. The note has been proof read but may still contain some grammatical/other typographical errors.

## 2023-03-03 NOTE — PROGRESS NOTES
ACUTE OCCUPATIONAL THERAPY GOALS:   (Developed with and agreed upon by patient and/or caregiver.)  1. Patient will complete lower body bathing and dressing with MODIFIED INDEPENDENCE and adaptive equipment as needed. 2. Patient will complete toileting with INDEPENDENCE. 3. Patient will complete grooming ADL standing at sink with INDEPENDENCE.  4. Patient will tolerate 25 minutes of OT treatment with 1-2 rest breaks to increase activity tolerance for ADLs. 5. Patient will complete functional transfers with MODIFIED INDEPENDENCE and adaptive equipment as needed. 6. Patient will tolerate 10 minutes BUE exercises to increase strength for safe, functional transfers. Timeframe: 7 visits     OCCUPATIONAL THERAPY Initial Assessment and Daily Note       OT Visit Days: 1  Acknowledge Orders  Time  OT Charge Capture  Rehab Caseload Tracker      WBAT RLE, no ROM restrictions    Linnea Sarmiento is a 45 y.o. female   PRIMARY DIAGNOSIS: Osteoarthritis of right hip, unspecified osteoarthritis type  Primary localized osteoarthritis of right hip [M16.11]  Osteoarthritis of right hip, unspecified osteoarthritis type [M16.11]  Procedure(s) (LRB):  RIGHT TOTAL HIP  ARTHROPLASTY ANTERIOR (Right)  1 Day Post-Op  Reason for Referral: Pain in Right Hip (M25.551)  Stiffness of Right Hip, Not elsewhere classified (M25.651)  Difficulty in walking, Not elsewhere classified (R26.2)  Other abnormalities of gait and mobility (R26.89)  Outpatient in a bed: Payor: BC[employer] / [place] / Product Type: *No Product type* /     ASSESSMENT:     REHAB RECOMMENDATIONS:   Recommendation to date pending progress:  Setting:  No further skilled therapy after discharge from hospital    Equipment:    None--pt has RW and BSC at home     ASSESSMENT:  Ms. Niharika Monreal is a 44 y/o female  presents s/p right LORENA with Dr. Robb Saldaña and is WBAT RLE with no ROM restrictions. At baseline pt lives with her , is independent all ADLs and drives.  Today pt presents with decreased balance and increased pain impacting ADLs. Pt overall supervision RW for functional transfers and mobility of household distances. Pt declined shower today due to pain, but able to complete LE dressing and toileting with supervision. Pt reported already getting dressed and grooming prior to evaluation independently in room. Pt educated on adaptive techniques for ADLs to decreased pain and increase safety--pt verbalized understanding. Pt is currently functioning below baseline and would benefit from skilled OT services to address OT goals and plan of care. Likely no needs at d/c.       [address] [facility] [address] [address] AM-PAC 6 Clicks Daily Activity Inpatient Short Form:    AM-PAC Daily Activity - Inpatient   How much help is needed for putting on and taking off regular lower body clothing?: A Little  How much help is needed for bathing (which includes washing, rinsing, drying)?: A Little  How much help is needed for toileting (which includes using toilet, bedpan, or urinal)?: None  How much help is needed for putting on and taking off regular upper body clothing?: None  How much help is needed for taking care of personal grooming?: None  How much help for eating meals?: None  [facility] Inpatient Daily Activity Raw Score: 22  AM-PAC Inpatient ADL T-Scale Score : 47.1  ADL Inpatient CMS 0-100% Score: 25.8  ADL Inpatient CMS G-Code Modifier : CJ           SUBJECTIVE:     Ms. Deshpande Sites states, \"This is not my first hip surgery, I am very familiar with all of this\"     Social/Functional Lives With: Significant other, Daughter  Type of Home: House  Home Layout: One level  Home Access: Stairs to enter without rails  Entrance Stairs - Number of Steps: 1  Home Equipment: Walker, rolling (shower chair, BSC)  Has the patient had two or more falls in the past year or any fall with injury in the past year?: No  ADL Assistance: Independent  Homemaking Assistance: Independent  Ambulation Assistance: Independent  Transfer Assistance: Independent    OBJECTIVE:     Kristie Lyons / Nidia Lard / AIRWAY: None    RESTRICTIONS/PRECAUTIONS:  Restrictions/Precautions: Weight Bearing, Fall Risk  Right Lower Extremity Weight Bearing: Weight Bearing As Tolerated    PAIN: VITALS / O2:   Pre Treatment:   Pain Assessment: 0-10  Pain Level: 7  Pain Location: Hip  Pain Orientation: Right  Pain Descriptors: Sore  Non-Pharmaceutical Pain Intervention(s): Nurse notified (comment) (said not ready for pain meds yet)      Post Treatment: same, positioned for comfort EOB       Vitals          Oxygen            GROSS EVALUATION: INTACT IMPAIRED   (See Comments)   UE AROM [x] []   UE PROM [x] []   Strength [x]       Posture / Balance [] Posture: Good  Sitting - Static: Good  Sitting - Dynamic: Good  Standing - Static: Good, -  Standing - Dynamic: Fair, +   Sensation [x]     Coordination [x]       Tone [x]       Edema [x]    Activity Tolerance [] Patient limited by pain     Hand Dominance R [] L []      COGNITION/  PERCEPTION: INTACT IMPAIRED   (See Comments)   Orientation [x]     Vision [x]     Hearing [x]     Cognition  [x]     Perception [x]       MOBILITY: I Mod I S SBA CGA Min Mod Max Total  NT x2 Comments: pt received sitting in chair   Bed Mobility    Rolling [] [] [] [] [] [] [] [] [] [x] []    Supine to Sit [] [] [] [] [] [] [] [] [] [x] []    Scooting [] [] [x] [] [] [] [] [] [] [] []    Sit to Supine [] [] [x] [] [] [] [] [] [] [] []    Transfers    Sit to Stand [] [] [x] [] [] [] [] [] [] [] [] RW   Bed to Chair [] [] [x] [] [] [] [] [] [] [] [] RW   Stand to Sit [] [] [x] [] [] [] [] [] [] [] [] RW   Tub/Shower [] [] [] [] [] [] [] [] [] [x] []     Toilet [] [] [x] [] [] [] [] [] [] [] []  RW to [facility] over standard toilet    [] [] [] [] [] [] [] [] [] [x] []    I=Independent, Mod I=Modified Independent, S=Supervision/Setup, SBA=Standby Assistance, CGA=Contact Guard Assistance, Min=Minimal Assistance, Mod=Moderate Assistance, Max=Maximal Assistance, Total=Total Assistance, NT=Not Tested    ACTIVITIES OF DAILY LIVING: I Mod I S SBA CGA Min Mod Max Total NT Comments   BASIC ADLs:              Upper Body Bathing  [] [] [] [] [] [] [] [] [] [x]    Lower Body Bathing [] [] [] [] [] [] [] [] [] [x]    Toileting [] [] [] [] [] [] [] [] [] [x]    Upper Body Dressing [] [] [] [] [] [] [] [] [] [x]    Lower Body Dressing [] [] [x] [] [] [] [] [] [] [] Donning/doffing pants sitting/standing from chair RW   Feeding [] [] [] [] [] [] [] [] [] [x]    Grooming [] [] [] [] [] [] [] [] [] [x]    Personal Device Care [] [] [] [] [] [] [] [] [] [x]    Functional Mobility [] [] [x] [] [] [] [] [] [] [] RW   I=Independent, Mod I=Modified Independent, S=Supervision/Setup, SBA=Standby Assistance, CGA=Contact Guard Assistance, Min=Minimal Assistance, Mod=Moderate Assistance, Max=Maximal Assistance, Total=Total Assistance, NT=Not Tested    PLAN:   FREQUENCY/DURATION   OT Plan of Care: 5 times/week for duration of hospital stay or until stated goals are met, whichever comes first.    PROBLEM LIST:   (Skilled intervention is medically necessary to address:)  Decreased ADL/Functional Activities  Decreased Balance  Decreased Gait Ability  Decreased Transfer Abilities  Increased Pain   INTERVENTIONS PLANNED:  (Benefits and precautions of occupational therapy have been discussed with the patient.)  Self Care Training  Therapeutic Activity  Therapeutic Exercise/HEP  Neuromuscular Re-education  Manual Therapy  Education         TREATMENT:     EVALUATION: LOW COMPLEXITY: (Untimed Charge)    TREATMENT:   Self Care (15 minutes): Patient participated in toileting and lower body dressing ADLs in unsupported sitting and standing with minimal verbal, manual, and tactile cueing to increase independence, decrease assistance required, increase activity tolerance, and learn adaptive technique.  Patient also participated in functional mobility, functional transfer, and adaptive equipment training to increase independence, decrease assistance required, increase activity tolerance, and increase safety awareness.      TREATMENT GRID:  N/A    AFTER TREATMENT PRECAUTIONS: Bed, Call light within reach, Needs within reach, and RN notified    INTERDISCIPLINARY COLLABORATION:  RN/ PCT and OT/ LOPEZ    EDUCATION:  Education Given To: Patient  Education Provided: Role of Therapy;Plan of Care;ADL Adaptive Strategies  Education Method: Verbal  Barriers to Learning: None  Education Outcome: Verbalized understanding    TOTAL TREATMENT DURATION AND TIME:  Time In: 1005  Time Out: [address] [place]  Minutes: [address], [place]

## 2023-03-03 NOTE — DISCHARGE SUMMARY
Discharge Summary     Patient ID:  Tiffany Torres  [id number]   45 y.o.  1970    Admit date: 3/2/2023    Discharge Date: 3/3/2023    Admitting Physician: Peter Dobbs MD     Discharge Physician: Peter Dobbs MD    Admission Diagnoses: Primary localized osteoarthritis of right hip [M16.11]  Osteoarthritis of right hip, unspecified osteoarthritis type [M16.11]    Last Procedure: Procedure(s):  RIGHT TOTAL HIP  ARTHROPLASTY ANTERIOR    Discharge Diagnoses: Principal Problem:    Osteoarthritis of right hip, unspecified osteoarthritis type  Active Problems:    Essential hypertension    Prediabetes    Obesity (BMI 30.0-34. 9)  Resolved Problems:    * No resolved hospital problems. *       Consults: Medicine, PT, OT    Significant Diagnostic Studies: None    Hospital Course:     Patient underwent the procedure listed above and tolerated the procedure well without any complication. Medicine was consulted for medical management. Patient was started on mechanical and ASA DVT prophylaxis along with post-operative antibiotics. Pain control was maintained on oral medications. The patient's neurovascular exam was baseline as compared to the preoperative assessment. Hemoglobin level was monitored throughout the hospital stay and he did not require transfusion. The patient progressed with physical therapy. At discharge, the patient's pain was well controlled, voiding, good oral intake, and mobilizing appropriately.      Disposition: Home    Patient Instructions:   Current Discharge Medication List        START taking these medications    Details   acetaminophen (TYLENOL) 500 MG tablet Take 2 tablets by mouth every 8 (eight) hours for 14 days  Qty: 84 tablet, Refills: 0      aspirin 81 MG EC tablet Take 1 tablet by mouth 2 times daily for 28 days  Qty: 56 tablet, Refills: 0      meloxicam (MOBIC) 15 MG tablet Take 1 tablet by mouth daily for 14 days  Qty: 14 tablet, Refills: 0      sennosides-docusate sodium (SENOKOT-S) 8.6-50 MG tablet Take 1 tablet by mouth 2 times daily for 14 days  Qty: 28 tablet, Refills: 0      oxyCODONE (OXY-IR) 10 MG immediate release tablet Take 1 tablet by mouth every 4 hours as needed for Pain for up to 7 days. Max Daily Amount: 60 mg  Qty: 30 tablet, Refills: 0    Comments: Reduce doses taken as pain becomes manageable  Associated Diagnoses: Osteoarthritis of right hip, unspecified osteoarthritis type           CONTINUE these medications which have NOT CHANGED    Details   amLODIPine (NORVASC) 10 MG tablet Take 10 mg by mouth every evening      hydroCHLOROthiazide (MICROZIDE) 12.5 MG capsule Take 12.5 mg by mouth every evening      nitrofurantoin, macrocrystal-monohydrate, (MACROBID) 100 MG capsule Take 100 mg by mouth 2 times daily 3 more days left      traMADol (ULTRAM ER) 200 MG extended release tablet Take 200 mg by mouth every evening. traMADol (ULTRAM) 50 MG tablet Take 50 mg by mouth every 6 hours as needed for Pain.       ondansetron (ZOFRAN) 8 MG tablet Take 8 mg by mouth every 8 hours as needed for Nausea or Vomiting      rizatriptan (MAXALT) 10 MG tablet Take 10 mg by mouth once as needed for Migraine May repeat in 2 hours if needed      fluticasone furoate-vilanterol (BREO ELLIPTA) 100-25 MCG/ACT inhaler Inhale 1 puff into the lungs daily as needed (Seasonal allergies)      diphenhydrAMINE (BENADRYL) 25 MG tablet Take 25 mg by mouth every 6 hours as needed for Sleep or Allergies      Coenzyme Q10 (COQ-10) 100 MG CAPS Take by mouth daily      Multiple Vitamins-Minerals (MULTI COMPLETE PO) Take by mouth daily      Potassium 95 MG TABS Take by mouth daily      vitamin D (CHOLECALCIFEROL) 25 MCG (1000 UT) TABS tablet Take 1,000 Units by mouth daily      metFORMIN (GLUCOPHAGE) 1000 MG tablet Take 1,500 mg by mouth every evening           STOP taking these medications       Diclofenac Sodium 100 MG TB24 Comments:   Reason for Stopping:               Diet: Reference my discharge instructions. Activity: Reference my discharge instructions. Follow-up with Dr. Deven Gibbs in 2 weeks    Total time discharging patient took greater than 30 minutes.     Signed:24  Lucio Martin MD  March 2, 2023  8:51 PM

## 2023-03-03 NOTE — PLAN OF CARE
Problem: Pain  Goal: Verbalizes/displays adequate comfort level or baseline comfort level  3/3/2023 1100 by Jose Carlos Puentes RN  Outcome: Adequate for Discharge  3/3/2023 0210 by Sade Jacobs RN  Outcome: Progressing     Problem: Discharge Planning  Goal: Discharge to home or other facility with appropriate resources  3/3/2023 1100 by Jose Carlos Puentes RN  Outcome: Adequate for Discharge  3/3/2023 0210 by Sade Jacobs RN  Outcome: Progressing  Flowsheets (Taken 3/2/2023 1740 by Wang York RN)  Discharge to home or other facility with appropriate resources:   Identify barriers to discharge with patient and caregiver   Arrange for needed discharge resources and transportation as appropriate     Problem: ABCDS Injury Assessment  Goal: Absence of physical injury  3/3/2023 1100 by Jose Carlos Puentes RN  Outcome: Adequate for Discharge  3/3/2023 0210 by Sade Jacobs RN  Outcome: Progressing     Problem: Safety - Adult  Goal: Free from fall injury  3/3/2023 1100 by Jose Carlos Puentes RN  Outcome: Adequate for Discharge  3/3/2023 0210 by Sade Jacobs RN  Outcome: Progressing

## 2023-03-03 NOTE — DISCHARGE INSTRUCTIONS
-DISCHARGE INSTRUCTIONS    -PLACE ICE TO SURGICAL AREA 20-30 MINUTES ON/ 20-30 MINUTES OFF. THIS IS TO HELP WITH PAIN & SWELLING. -TAKE PAIN MEDICATION AS PRESCRIBED. DO NOT DRIVE OR OPERATE MACHINERY WHILE TAKING PAIN MEDICATION. -YOU ARE TO TAKE ASPIRIN TWICE DAILY. THIS IS FOR BLOOD CLOT PREVENTION AND IS TAKEN FOR 30 DAYS AFTER SURGERY. IT IS VERY IMPORTANT THAT YOU TAKE THIS AS DIRECTED BY YOUR DOCTOR. -YOUR HOME HEALTH PHYSICAL THERAPY HAS BEEN SCHEDULED. IT WILL START WITHIN 1-2 DAYS AFTER LEAVING THE HOSPITAL. IF ANY QUESTIONS, THEN [facility]. -YOU WILL HAVE A POST-OP FOLLOW-UP APPOINTMENT IN THE OFFICE IN 2 WEEKS OF YOUR SURGERY    DRESSING:  - YOU HAVE A SPECIAL DRESSING CALLED AN [employer] DRESSING OVER YOUR INCISION.    - THE DRESSING STAYS FOR 7 DAYS FROM SURGERY.    - YOU MAY SHOWER AS SOON AS YOU RETURN HOME WITH THE AQU[employer]EL DRESSING INTACT OVER YOUR INCISION AREA.    - IF THE DRESSING LEAKS OR COMES LOOSE BEFORE THE 7 DAY PERIOD CONTACT YOUR SURGEON. - AFTER   7 DAYS THE DRESSING IS REMOVED BY PULLING ON THE EDGE OF THE DRESSING AND GENTLY STRETCHING IT, THEN PEEL IT OFF SLOWLY LIKE A BANDAID. IF YOU HAVE ANY QUESTIONS OR CONCERNS ABOUT THE DRESSING CONTACT YOUR DOCTOR.    - IF DRAINAGE IS PRESENT AT ANYTIME FROM YOUR DRESSING OR FROM YOUR INCISION CALL YOUR SURGEON AS SOON AS POSSIBLE. CONSTIPATION:  -YOU MAY EXPERIENCE CONSTIPATION WHILE YOU ARE TAKING NARCOTIC PAIN MEDICATIONS. TAKING A STOOL SOFTENER, DRINKING EXTRA WATER, OR EATING FRUITS AND VEGETABLES MAY PREVENT OR RELIEVE CONSTIPATION. YOU MAY ALSO TAKE MIRALAX . IF FAILURE TO THE ABOVE, YOU MAY TRY A DUCOLAX SUPPOSITORY.     REASONS TO CALL YOUR DOCTOR:  - TEMPERATURE .0 OR MORE  - PERSISTANT NAUSEA OR VOMITTING - ESPECIALLY IF YOU ARE UNABLE TO EAT OR DRINK.  - INCREASED LEVEL OF PAIN OR PAIN WHICH IS NOT CONTROLLED BY YOUR PAIN MEDICINE.  - DRAINAGE AT ANYTIME FROM YOUR SURGICAL AREA / INCISION. - PAIN, TENDERNESS OR SUDDEN SWELLING IN YOUR LOWER EXTREMITIES - THIS IS A POSSIBLE SIGN OF A BLOOD CLOT. - ANY CHANGES IN SENSATION IN YOUR BODY IN THE AREA OF YOUR SURGERY = NUMBNESS OR TINGLING.  - ANY CHANGES IN CIRCULATION IN YOUR BODY IN THE AREA OF YOUR SURGERY = CHANGES  IN COLOR EITHER DARKER OR VERY PALE; OR  IF AREA FEELS COLD TO THE TOUCH AS COMPARED TO OTHER AREAS. - ANY CHANGES IN FUNCTION IN YOUR BODY IN THE AREA OF YOUR SURGERY = CHANGE IN MOVEMENT - UNABLE TO MOVE OR WIGGLE FINGERS, TOES OR FOOT OR ANY OTHER CHANGES IN NORMAL BODY FUNCTIONING.  - FOR ANY QUESTIONS OR CONCERNS, PLEASE CALL THE OFFICE.

## 2023-03-05 LAB
BACTERIA SPEC CULT: NORMAL
GRAM STN SPEC: NORMAL
GRAM STN SPEC: NORMAL
SERVICE CMNT-IMP: NORMAL

## 2023-03-10 LAB
BACTERIA SPEC CULT: NORMAL
SERVICE CMNT-IMP: NORMAL

## (undated) DEVICE — SUTURE STRATAFIX SPRL SZ 1 L14IN ABSRB VLT L48CM CTX 1/2 SXPD2B405

## (undated) DEVICE — SUTURE ETHBND EXCEL SZ 5 L30IN NONABSORBABLE GRN L40MM V-37 MB66G

## (undated) DEVICE — C-ARM: Brand: UNBRANDED

## (undated) DEVICE — CUP ACET DIA54MM HIP GRIPTION PRI CEMENTLESS FIX SECT SER: Type: IMPLANTABLE DEVICE | Site: HIP | Status: NON-FUNCTIONAL

## (undated) DEVICE — BLADE ES L6IN ELASTOMERIC COAT EXT DURABLE BEND UPTO 90DEG

## (undated) DEVICE — GAUZE,SPONGE,8"X4",12PLY,XRAY,STRL,LF: Brand: MEDLINE

## (undated) DEVICE — ADHESIVE SKIN CLSR 0.7ML TOP DERMBND ADV

## (undated) DEVICE — SYRINGE MED 30ML STD CLR PLAS LUERLOCK TIP N CTRL DISP

## (undated) DEVICE — STRYKER PERFORMANCE SERIES SAGITTAL BLADE: Brand: STRYKER PERFORMANCE SERIES

## (undated) DEVICE — CLEANER,CAUTERY TIP,2X2",STERILE: Brand: MEDLINE

## (undated) DEVICE — DISPOSABLE ORTHOPAEDIC PROTECTOR: Brand: ALEXIS ® ORTHOPAEDIC PROTECTOR

## (undated) DEVICE — YANKAUER,BULB TIP,W/O VENT,RIGID,STERILE: Brand: MEDLINE

## (undated) DEVICE — SUTURE STRATAFIX SYMMETRIC SZ 1 L18IN ABSRB VLT CT1 L36CM SXPP1A404

## (undated) DEVICE — YANKAUER,FLEXIBLE HANDLE,REGLR CAPACITY: Brand: MEDLINE INDUSTRIES, INC.

## (undated) DEVICE — GRIPPER SURGICAL RETRACTOR DISP

## (undated) DEVICE — BIPOLAR SEALER 23-112-1 AQM 6.0: Brand: AQUAMANTYS ®

## (undated) DEVICE — HOOD WITH PEEL AWAY FACE SHIELD: Brand: T7PLUS

## (undated) DEVICE — GLOVE ORANGE PI 7   MSG9070

## (undated) DEVICE — SUTURE MCRYL SZ 3-0 L27IN ABSRB UD L26MM SH 1/2 CIR Y416H

## (undated) DEVICE — GLOVE SURG SZ 75 L12IN FNGR THK79MIL GRN LTX FREE

## (undated) DEVICE — DRAPE XR CASS L UNIV FIT ADH CLSR

## (undated) DEVICE — TOTAL HIP DR LEE: Brand: MEDLINE INDUSTRIES, INC.

## (undated) DEVICE — SHEET, ORTHO, SPLIT, STERILE: Brand: MEDLINE

## (undated) DEVICE — 1840 FOAM BLOCK NEEDLE COUNTER: Brand: DEVON